# Patient Record
Sex: FEMALE | Race: WHITE | NOT HISPANIC OR LATINO | Employment: UNEMPLOYED | URBAN - METROPOLITAN AREA
[De-identification: names, ages, dates, MRNs, and addresses within clinical notes are randomized per-mention and may not be internally consistent; named-entity substitution may affect disease eponyms.]

---

## 2018-01-03 ENCOUNTER — OFFICE VISIT (OUTPATIENT)
Dept: MEDICAL GROUP | Facility: PHYSICIAN GROUP | Age: 23
End: 2018-01-03
Payer: COMMERCIAL

## 2018-01-03 VITALS
BODY MASS INDEX: 22.88 KG/M2 | HEIGHT: 64 IN | HEART RATE: 79 BPM | OXYGEN SATURATION: 95 % | DIASTOLIC BLOOD PRESSURE: 68 MMHG | WEIGHT: 134 LBS | SYSTOLIC BLOOD PRESSURE: 90 MMHG | TEMPERATURE: 98.6 F

## 2018-01-03 DIAGNOSIS — Z23 NEED FOR VACCINATION: ICD-10-CM

## 2018-01-03 DIAGNOSIS — Z00.00 ROUTINE GENERAL MEDICAL EXAMINATION AT A HEALTH CARE FACILITY: ICD-10-CM

## 2018-01-03 DIAGNOSIS — F17.210 CIGARETTE SMOKER: ICD-10-CM

## 2018-01-03 DIAGNOSIS — J01.90 ACUTE BACTERIAL RHINOSINUSITIS: ICD-10-CM

## 2018-01-03 DIAGNOSIS — G89.29 CHRONIC PELVIC PAIN IN FEMALE: ICD-10-CM

## 2018-01-03 DIAGNOSIS — B96.89 ACUTE BACTERIAL RHINOSINUSITIS: ICD-10-CM

## 2018-01-03 DIAGNOSIS — R10.2 CHRONIC PELVIC PAIN IN FEMALE: ICD-10-CM

## 2018-01-03 PROCEDURE — 99214 OFFICE O/P EST MOD 30 MIN: CPT | Mod: 25 | Performed by: PHYSICIAN ASSISTANT

## 2018-01-03 PROCEDURE — 90686 IIV4 VACC NO PRSV 0.5 ML IM: CPT | Performed by: PHYSICIAN ASSISTANT

## 2018-01-03 PROCEDURE — 90471 IMMUNIZATION ADMIN: CPT | Performed by: PHYSICIAN ASSISTANT

## 2018-01-03 RX ORDER — AMOXICILLIN AND CLAVULANATE POTASSIUM 875; 125 MG/1; MG/1
1 TABLET, FILM COATED ORAL 2 TIMES DAILY
Qty: 14 TAB | Refills: 0 | Status: SHIPPED | OUTPATIENT
Start: 2018-01-03 | End: 2018-01-10

## 2018-01-03 ASSESSMENT — PATIENT HEALTH QUESTIONNAIRE - PHQ9: CLINICAL INTERPRETATION OF PHQ2 SCORE: 0

## 2018-01-04 NOTE — ASSESSMENT & PLAN NOTE
Smokes 1/4 ppd. Is not currently interested in quitting. States that her and her significant other are currently trying to get pregnant and she will stop smoking as soon as she finds out she's pregnant.

## 2018-01-04 NOTE — PROGRESS NOTES
Subjective:   Gurpreet Chang is a 22 y.o. female here today for pelvic pain, sinus drainage. She is a new patient to me and is also establishing care today.    Previous PCP: Miladis Hurley PA-C    Patient has the following current medical problems/concerns:    Cigarette smoker  Smokes 1/4 ppd. Is not currently interested in quitting. States that her and her significant other are currently trying to get pregnant and she will stop smoking as soon as she finds out she's pregnant.    Chronic pelvic pain in female  Patient states that she's been having ongoing left-sided pelvic pain for the last 5 years. Pain associated with occasional vaginal bleeding after intercourse. Was seeing gynecology for issue and underwent pelvic examination and pap smear which patient states were normal. No records currently available to review. She states she received order to have ultrasound done, but lost the order so has not yet had test done. Is requesting that this be re-ordered today. Pain comes and goes. Sometimes worse after eating but not always. Denies any specific food triggers. Not associated with any other abdominal symptoms including nausea, vomiting, diarrhea, constipation, or bloody stools. No other genitourinary symptoms including dysuria, hematuria, urinary frequency/urgency, vaginal discharge. Wonders if allergies could be to blame and if she should see an allergist.     Patient also complains of ongoing sinus drainage, facial pressure, and earache since October. She states that there were times where symptoms were starting to get better but then would get bad again. She states that her sinus drainage is thick and green. She has not yet undergone any kind of treatment and is wondering if she needs something to help get rid of her symptoms. She has not had fever. Denies any significant cough, shortness of breath, or chest pain.     Current medicines (including changes today)  Current Outpatient Prescriptions   Medication  "Sig Dispense Refill   • amoxicillin-clavulanate (AUGMENTIN) 875-125 MG Tab Take 1 Tab by mouth 2 times a day for 7 days. 14 Tab 0     No current facility-administered medications for this visit.      She  has a past medical history of Allergy; Anemia; ASTHMA; GERD (gastroesophageal reflux disease); Headache(784.0); Migraine; and Urinary tract infection, site not specified. She also has no past medical history of Anxiety; Arrhythmia; Arthritis; Clotting disorder (CMS-HCC); COPD; Cushings syndrome (CMS-HCC); Depression; Diabetes; Heart attack; Heart murmur; HIV (human immunodeficiency virus infection); Hyperlipidemia; Hypertension; IBD (inflammatory bowel disease); OSTEOPOROSIS; Seizure (CMS-HCC); Stroke (CMS-HCC); Substance abuse; Thyroid disease; Tuberculosis; or Ulcer (CMS-HCC).      ROS  Ear ROS: Positive for earache bilaterally   Pulmonary ROS: No shortness of breath  Cardiovascular ROS: No chest pain  Gastrointestinal ROS: Positive for left lower abdominopelvic pain. No change in bowel habits, No nausea, vomiting, diarrhea, or constipation  Genitourinary ROS: No dysuria, urgency, frequency, hematuria, vaginal discharge       Objective:     Blood pressure (!) 90/68, pulse 79, temperature 37 °C (98.6 °F), height 1.626 m (5' 4\"), weight 60.8 kg (134 lb), last menstrual period 12/17/2017, SpO2 95 %, not currently breastfeeding. Body mass index is 23 kg/m².   Physical Exam:  Constitutional: Alert, no distress.  Skin: Warm, dry, good turgor, no rashes in visible areas.  Eye: Pupils are equal and round, conjunctiva clear, lids normal.  ENMT: External ear canals are without any erythema, edema, or drainage. TM are pearly gray bilaterally without injection or bulging. Nasal turbinates are inflamed and injected with thick clear-white rhinorrhea. Lips without lesions, moist mucus membranes. No pharyngeal erythema or tonsillar hypertrophy.  Neck: No masses. No submandibular or cervical lymphadenopathy. No " tenderness.  Respiratory: Unlabored respiratory effort, lungs clear to auscultation, no wheezes, no rhonchi.  Cardiovascular: Normal S1, S2, no murmur, no lower extremity edema.  Abdomen: Normoactive bowel sounds. Abdomen is soft, non-distended. Very mild tenderness in LLQ without rebound or guarding. Nontender elsewhere in the abdomen. No palpable HSM.       Assessment and Plan:   The following treatment plan was discussed    1. Chronic pelvic pain in female  Established issue, currently still uncontrolled. Recommend that patient have US done. This was re-ordered. Explained to patient that there are many causes for abdominopelvic pain but I do not suspect that allergies are to blame. Patient advised to follow up after US. If findings are normal, will need additional work-up to determine etiology of pain.  - US-GYN-PELVIS TRANSVAGINAL; Future    2. Acute bacterial rhinosinusitis  New problem. Given longevity of symptoms will treat empirically for bacterial sinusitis with Augmentin.  - amoxicillin-clavulanate (AUGMENTIN) 875-125 MG Tab; Take 1 Tab by mouth 2 times a day for 7 days.  Dispense: 14 Tab; Refill: 0    3. Cigarette smoker  Smoking cessation strongly encouraged. Not interested in any cessation medications at this time. States that when she gets she plans to do it on her own.    4. Routine general medical examination at a health care facility  Patient requested routine lab work.   - CBC WITH DIFFERENTIAL; Future  - BASIC METABOLIC PANEL; Future  - VITAMIN D,25 HYDROXY; Future  - LIPID PROFILE; Future    5. Need for vaccination  - INFLUENZA VACCINE QUAD INJ >3Y(PF)    Gynecology records requested.  Followup: Return for f/u pelvic pain after US; Short.    Michelle Jenkins P.A.-C.

## 2018-01-17 ENCOUNTER — HOSPITAL ENCOUNTER (OUTPATIENT)
Dept: RADIOLOGY | Facility: MEDICAL CENTER | Age: 23
End: 2018-01-17
Attending: PHYSICIAN ASSISTANT
Payer: COMMERCIAL

## 2018-01-17 ENCOUNTER — TELEPHONE (OUTPATIENT)
Dept: MEDICAL GROUP | Facility: PHYSICIAN GROUP | Age: 23
End: 2018-01-17

## 2018-01-17 DIAGNOSIS — R10.2 CHRONIC PELVIC PAIN IN FEMALE: ICD-10-CM

## 2018-01-17 DIAGNOSIS — G89.29 CHRONIC PELVIC PAIN IN FEMALE: ICD-10-CM

## 2018-01-17 PROCEDURE — 76830 TRANSVAGINAL US NON-OB: CPT

## 2018-01-18 NOTE — TELEPHONE ENCOUNTER
----- Message from Michelle Jenkins P.A.-C. sent at 1/17/2018  3:49 PM PST -----  Please inform patient that pelvic ultrasound was normal. If she is still having pelvic pain, recommend follow-up appointment.  Michelle Jenkins P.A.-C.

## 2018-08-13 ENCOUNTER — OFFICE VISIT (OUTPATIENT)
Dept: INTERNAL MEDICINE | Facility: MEDICAL CENTER | Age: 23
End: 2018-08-13
Payer: COMMERCIAL

## 2018-08-13 ENCOUNTER — HOSPITAL ENCOUNTER (OUTPATIENT)
Dept: LAB | Facility: MEDICAL CENTER | Age: 23
End: 2018-08-13
Attending: INTERNAL MEDICINE
Payer: COMMERCIAL

## 2018-08-13 VITALS
SYSTOLIC BLOOD PRESSURE: 120 MMHG | DIASTOLIC BLOOD PRESSURE: 80 MMHG | WEIGHT: 131.2 LBS | HEIGHT: 64 IN | OXYGEN SATURATION: 98 % | BODY MASS INDEX: 22.4 KG/M2 | HEART RATE: 70 BPM | TEMPERATURE: 98.1 F

## 2018-08-13 DIAGNOSIS — Z32.01 PREGNANCY TEST POSITIVE: ICD-10-CM

## 2018-08-13 LAB — HCG SERPL QL: POSITIVE

## 2018-08-13 PROCEDURE — 84703 CHORIONIC GONADOTROPIN ASSAY: CPT

## 2018-08-13 PROCEDURE — 36415 COLL VENOUS BLD VENIPUNCTURE: CPT

## 2018-08-13 PROCEDURE — 99213 OFFICE O/P EST LOW 20 MIN: CPT | Mod: GE | Performed by: INTERNAL MEDICINE

## 2018-08-13 NOTE — PATIENT INSTRUCTIONS
Pregnancy  If you are planning on getting pregnant, it is a good idea to make a preconception appointment with your caregiver to discuss having a healthy lifestyle before getting pregnant. This includes diet, weight, exercise, taking prenatal vitamins (especially folic acid, which helps prevent brain and spinal cord defects), avoiding alcohol, smoking and illegal drugs, medical problems (diabetes, convulsions), family history of genetic problems, working conditions, and immunizations. It is better to have knowledge of these things and do something about them before getting pregnant.  During your pregnancy, it is important to follow certain guidelines in order to have a healthy baby. It is very important to get good prenatal care and follow your caregiver's instructions. Prenatal care includes all the medical care you receive before your baby's birth. This helps to prevent problems during the pregnancy and childbirth.  HOME CARE INSTRUCTIONS   · Start your prenatal visits by the 12th week of pregnancy or earlier, if possible. At first, appointments are usually scheduled monthly. They become more frequent in the last 2 months before delivery. It is important that you keep your caregiver's appointments and follow your caregiver's instructions regarding medication use, exercise, and diet.  · During pregnancy, you are providing food for you and your baby. Eat a regular, well-balanced diet. Choose foods such as meat, fish, milk and other dairy products, vegetables, fruits, whole-grain breads and cereals. Your caregiver will inform you of the ideal weight gain depending on your current height and weight. Drink lots of liquids. Try to drink 8 glasses of water a day.  · Alcohol is associated with a number of birth defects including fetal alcohol syndrome. It is best to avoid alcohol completely. Smoking will cause low birth rate and prematurity. Use of alcohol and nicotine during your pregnancy also increases the chances that  your child will be chemically dependent later in their life and may contribute to SIDS (Sudden Infant Death Syndrome).  · Do not use illegal drugs.  · Only take prescription or over-the-counter medications that are recommended by your caregiver. Other medications can cause genetic and physical problems in the baby.  · Morning sickness can often be helped by keeping soda crackers at the bedside. Eat a few before getting up in the morning.  · A sexual relationship may be continued until near the end of pregnancy if there are no other problems such as early (premature) leaking of amniotic fluid from the membranes, vaginal bleeding, painful intercourse or belly (abdominal) pain.  · Exercise regularly. Check with your caregiver if you are unsure of the safety of some of your exercises.  · Do not use hot tubs, steam rooms or saunas. These increase the risk of fainting and hurting yourself and the baby. Swimming is OK for exercise. Get plenty of rest, including afternoon naps when possible, especially in the third trimester.  · Avoid toxic odors and chemicals.  · Do not wear high heels. They may cause you to lose your balance and fall.  · Do not lift over 5 pounds. If you do lift anything, lift with your legs and thighs, not your back.  · Avoid long trips, especially in the third trimester.  · If you have to travel out of the city or state, take a copy of your medical records with you.  SEEK IMMEDIATE MEDICAL CARE IF:   · You develop an unexplained oral temperature above 102° F (38.9° C), or as your caregiver suggests.  · You have leaking of fluid from the vagina. If leaking membranes are suspected, take your temperature and inform your caregiver of this when you call.  · There is vaginal spotting or bleeding. Notify your caregiver of the amount and how many pads are used.  · You continue to feel sick to your stomach (nauseous) and have no relief from remedies suggested, or you throw up (vomit) blood or coffee ground like  materials.  · You develop upper abdominal pain.  · You have round ligament discomfort in the lower abdominal area. This still must be evaluated by your caregiver.  · You feel contractions of the uterus.  · You do not feel the baby move, or there is less movement than before.  · You have painful urination.  · You have abnormal vaginal discharge.  · You have persistent diarrhea.  · You get a severe headache.  · You have problems with your vision.  · You develop muscle weakness.  · You feel dizzy and faint.  · You develop shortness of breath.  · You develop chest pain.  · You have back pain that travels down to your leg and feet.  · You feel irregular or a very fast heartbeat.  · You develop excessive weight gain in a short period of time (5 pounds in 3 to 5 days).  · You are involved in a domestic violence situation.  Document Released: 12/18/2006 Document Revised: 06/18/2013 Document Reviewed: 06/11/2010  [employer]® Patient Information ©2014 [employer].

## 2018-08-14 NOTE — PROGRESS NOTES
Established Patient    Gurpreet presents today with the following:    CC: Home pregnancy test positive x 3.     HPI: 23-year-old female with a history of smoking abuse currently 1/4 ppd, chronic pelvic pain/dyspareunia,  asthma/sinusitis, migraines coming in today to get a pregnancy test. Patient reports the reason she decided to come into our clinic was as she was unable to get an appointment with her regular doctor/primary care. She would like to get confirmatory pregnancy test done today. She reports she missed her period this month as her last period was in July 1st week and she is very regular. She did end up checking and/or doing a pregnancy test at home that came out to be +3 times in a row and would like to get a serum and/or blood test done today as part of the confirmatory testing. She does report early signs of pregnancy including mood changes, headache, and intermittent nausea. Denies any spotting and bleeding and/or other history of pregnancy  or miscarriages.        Patient Active Problem List    Diagnosis Date Noted   • Chronic pelvic pain in female 2018   • Cigarette smoker 2016       No current outpatient prescriptions on file.     No current facility-administered medications for this visit.          Health Maintenance        Hep C: Screening for those born between 4198-0500    Diabetes: All non-Caucasians; All Caucasians with sustained blood pressure greater than 135/80, or BMI greater than or equal to 25, or history of gestational diabetes, or family history of diabetes.    Colorectal Cancer (Options): Colonoscopy every 10 years; Fecal Occult Blood Testing every 3 years with sigmoidoscopy every 5 years; or Annual Fecal Occult Blood testing.    HIV Screening: Between ages 15-65    Alcohol Misuse:     Influenza: Yearly    Tdap/Td: Adults under 65 who have never received Tdap should get a Tdap booster, regardless of when a prior Td was given. After this, Td is required every 10  "years.    Zoster (Shingles): At age 60    Pneumococcal Vaccine: Series beginning at age 65    Men's Health  Lipid Test: Every 5 years  Prostate Specific Antigen (PSA): Current evidence does not recommend routine PSA screening for average risk men.    Women's Health  Cervical Cancer Screening: Pap only every 3 years for ages 21-29, Pap test with HPV screening every 5 years from ages 30-65  Mammogram: Every 2 years  Bone Density: At age 65        ROS: As stated above.     /80   Pulse 70   Temp 36.7 °C (98.1 °F)   Ht 1.626 m (5' 4\")   Wt 59.5 kg (131 lb 3.2 oz)   SpO2 98%   BMI 22.52 kg/m²     Physical Exam     Constitutional: Alert and oriented, No acute distress   HEENT: Normocephalic, Atraumatic, Bilateral external ears normal, Oropharynx moist mucous membranes, No oral exudates, Nose normal. No thyromegaly.   Eyes: PERRLA, EOMI, Conjunctiva normal, No discharge.   Neck: Normal range of motion, No stridor, no JVD.   Cardiovascular: Normal heart rate, Normal rhythm, No murmurs, No rubs, No gallops.   Lungs: Clear to auscultation bilaterally   Abdomen: Bowel sounds normal, Soft, , No guarding   Skin: Warm, Dry, No erythema, No rash   Neurologic: Normal motor function, No focal deficits noted, cranial nerves II through XII are normal   Psychiatric: Affect normal, Judgment normal, Mood normal.   Extremities: B/L Peripheral Pulses +, no pitting edema.      Assessment and Plan    1. Pregnancy test positive  -Signs of early pregnancy and home test was positive ×3.  -Urine test done today in the office was positive and patient was given blood test confirmatory test.  -Patient advised to establish with an OB/GYN doctor in the near future through her primary care doctor.  -She was also advised to stop smoking given various adverse/congenital birth defects especially during early pregnancy.  -Serum beta-hCG ordered today in the office.    Signed by: Melchor Youngblood M.D.    "

## 2018-08-15 ENCOUNTER — TELEPHONE (OUTPATIENT)
Dept: INTERNAL MEDICINE | Facility: MEDICAL CENTER | Age: 23
End: 2018-08-15

## 2018-08-15 NOTE — TELEPHONE ENCOUNTER
Per pt called and was just following up on her blood work results. Would like doctor to follow up on her status of the results, and notify her.

## 2018-08-15 NOTE — TELEPHONE ENCOUNTER
Informed patient that blood pregnancy test was positive. Needs to schedule appointment with OB. Given [facility] scheduling number. Recommend starting daily prenatal vitamin with DHA. Patient will  at the drugstore.  Michelle Jenkins P.A.-C.

## 2018-11-28 ENCOUNTER — OFFICE VISIT (OUTPATIENT)
Dept: URGENT CARE | Facility: PHYSICIAN GROUP | Age: 23
End: 2018-11-28
Payer: COMMERCIAL

## 2018-11-28 VITALS
OXYGEN SATURATION: 96 % | HEIGHT: 64 IN | TEMPERATURE: 98 F | BODY MASS INDEX: 25.61 KG/M2 | DIASTOLIC BLOOD PRESSURE: 66 MMHG | HEART RATE: 74 BPM | SYSTOLIC BLOOD PRESSURE: 104 MMHG | WEIGHT: 150 LBS

## 2018-11-28 DIAGNOSIS — J01.40 ACUTE PANSINUSITIS, RECURRENCE NOT SPECIFIED: ICD-10-CM

## 2018-11-28 PROCEDURE — 99214 OFFICE O/P EST MOD 30 MIN: CPT | Performed by: NURSE PRACTITIONER

## 2018-11-28 RX ORDER — AMOXICILLIN 500 MG/1
500 CAPSULE ORAL 2 TIMES DAILY
Qty: 20 CAP | Refills: 0 | Status: SHIPPED | OUTPATIENT
Start: 2018-11-28 | End: 2018-12-08

## 2018-11-28 NOTE — PROGRESS NOTES
Subjective:      Gurpreet Chang is a 23 y.o. female who presents with Sinus Problem (Headache, runny nose, ear pain, cough x 3 wks. Pt is 20 wks pregnant. )            HPI  C/o green nasal d/c, c/o frontal sinus HA, nasal congestion, runny nose, ear pain. 20 weeks pregnant. No OTC meds used.  No saline or nasal sprays, no salt water gargling. Denies fevers or sore throat.    PMH:  has a past medical history of Allergy; Anemia; ASTHMA; GERD (gastroesophageal reflux disease); Headache(784.0); Migraine; and Urinary tract infection, site not specified. She also has no past medical history of Anxiety; Arrhythmia; Arthritis; Clotting disorder (HCC); COPD; Cushings syndrome (HCC); Depression; Diabetes; Heart attack (HCC); Heart murmur; HIV (human immunodeficiency virus infection); Hyperlipidemia; Hypertension; IBD (inflammatory bowel disease); OSTEOPOROSIS; Seizure (HCC); Stroke ([facility]); Substance abuse; Thyroid disease; Tuberculosis; or Ulcer.  MEDS:   Current Outpatient Prescriptions:   •  amoxicillin (AMOXIL) 500 MG Cap, Take 1 Cap by mouth 2 times a day for 10 days., Disp: 20 Cap, Rfl: 0  ALLERGIES: No Known Allergies  SURGHX: No past surgical history on file.  SOCHX:  reports that she has been smoking Cigarettes.  She has a 3.75 pack-year smoking history. She has never used smokeless tobacco. She reports that she does not drink alcohol or use drugs.  FH: Family history was reviewed, no pertinent findings to report    Review of Systems   Constitutional: Negative for chills, fever and malaise/fatigue.   HENT: Positive for congestion, ear pain and sinus pain. Negative for ear discharge and sore throat.    Eyes: Negative for discharge and redness.   Respiratory: Negative for cough, sputum production, shortness of breath and wheezing.    Cardiovascular: Negative for chest pain, palpitations and orthopnea.   Gastrointestinal: Negative for abdominal pain, constipation, diarrhea, nausea and vomiting.   Musculoskeletal:  "Negative for myalgias and neck pain.   Skin: Negative for itching and rash.   Neurological: Negative for dizziness, weakness and headaches.   Endo/Heme/Allergies: Negative for environmental allergies.   All other systems reviewed and are negative.         Objective:     /66 (BP Location: Right arm, Patient Position: Sitting, BP Cuff Size: Adult)   Pulse 74   Temp 36.7 °C (98 °F) (Temporal)   Ht 1.626 m (5' 4\")   Wt 68 kg (150 lb)   SpO2 96%   BMI 25.75 kg/m²      Physical Exam   Constitutional: She is oriented to person, place, and time. Vital signs are normal. She appears well-developed and well-nourished. She is active and cooperative.  Non-toxic appearance. She does not have a sickly appearance. She does not appear ill. No distress.   HENT:   Head: Normocephalic.   Right Ear: External ear and ear canal normal. Tympanic membrane is injected.   Left Ear: External ear and ear canal normal. Tympanic membrane is injected.   Nose: Mucosal edema, rhinorrhea and sinus tenderness present. Right sinus exhibits maxillary sinus tenderness and frontal sinus tenderness. Left sinus exhibits maxillary sinus tenderness and frontal sinus tenderness.   Mouth/Throat: Uvula is midline, oropharynx is clear and moist and mucous membranes are normal. Mucous membranes are not dry. No uvula swelling. No posterior oropharyngeal edema or posterior oropharyngeal erythema. Tonsils are 1+ on the right. Tonsils are 1+ on the left. No tonsillar exudate.   Eyes: Pupils are equal, round, and reactive to light. Conjunctivae and EOM are normal.   Neck: Normal range of motion. Neck supple.   Cardiovascular: Normal rate and regular rhythm.    Pulmonary/Chest: Effort normal and breath sounds normal. No accessory muscle usage. No respiratory distress. She has no decreased breath sounds. She has no wheezes. She has no rhonchi. She has no rales.   Musculoskeletal: Normal range of motion.   Lymphadenopathy:     She has no cervical adenopathy. "   Neurological: She is alert and oriented to person, place, and time.   Skin: Skin is warm and dry. She is not diaphoretic.   Vitals reviewed.              Assessment/Plan:     1. Acute pansinusitis, recurrence not specified    - amoxicillin (AMOXIL) 500 MG Cap; Take 1 Cap by mouth 2 times a day for 10 days.  Dispense: 20 Cap; Refill: 0    Increase water intake  Get rest  May use Tylenol prn for any fever, body aches or throat pain  May take longer acting antihistamine for seasonal allergy symptoms prn like Claritin ( no decongestant use)  May use saline nasal spray for nasal congestion  May use Flonase for allergen nasal congestion once daily x 7 days  May take alcohol free Robitussin (guaifenesin only) at minimal dosage use only  May gargle with salt water prn for throat discomfort  May drink smoothies for nutrition if too painful to swallow solid foods  Monitor for productive cough, SOB and chest pain/tightness- need re-evaluation  F/u with OB at next visit, verify meds for sinus symptom use

## 2018-11-29 ASSESSMENT — ENCOUNTER SYMPTOMS
NAUSEA: 0
FEVER: 0
CHILLS: 0
SORE THROAT: 0
PALPITATIONS: 0
WEAKNESS: 0
SPUTUM PRODUCTION: 0
HEADACHES: 0
EYE REDNESS: 0
NECK PAIN: 0
MYALGIAS: 0
CONSTIPATION: 0
DIARRHEA: 0
EYE DISCHARGE: 0
DIZZINESS: 0
WHEEZING: 0
ABDOMINAL PAIN: 0
COUGH: 0
ORTHOPNEA: 0
SINUS PAIN: 1
SHORTNESS OF BREATH: 0
VOMITING: 0

## 2019-04-08 ENCOUNTER — ANESTHESIA EVENT (OUTPATIENT)
Dept: OBGYN | Facility: MEDICAL CENTER | Age: 24
End: 2019-04-08
Payer: COMMERCIAL

## 2019-04-08 ENCOUNTER — HOSPITAL ENCOUNTER (INPATIENT)
Facility: MEDICAL CENTER | Age: 24
LOS: 2 days | End: 2019-04-10
Attending: OBSTETRICS & GYNECOLOGY | Admitting: OBSTETRICS & GYNECOLOGY
Payer: COMMERCIAL

## 2019-04-08 ENCOUNTER — ANESTHESIA (OUTPATIENT)
Dept: OBGYN | Facility: MEDICAL CENTER | Age: 24
End: 2019-04-08
Payer: COMMERCIAL

## 2019-04-08 LAB
BASOPHILS # BLD AUTO: 0.4 % (ref 0–1.8)
BASOPHILS # BLD: 0.03 K/UL (ref 0–0.12)
EOSINOPHIL # BLD AUTO: 0.13 K/UL (ref 0–0.51)
EOSINOPHIL NFR BLD: 1.7 % (ref 0–6.9)
ERYTHROCYTE [DISTWIDTH] IN BLOOD BY AUTOMATED COUNT: 46.3 FL (ref 35.9–50)
HCT VFR BLD AUTO: 35 % (ref 37–47)
HGB BLD-MCNC: 11.8 G/DL (ref 12–16)
HOLDING TUBE BB 8507: NORMAL
IMM GRANULOCYTES # BLD AUTO: 0.06 K/UL (ref 0–0.11)
IMM GRANULOCYTES NFR BLD AUTO: 0.8 % (ref 0–0.9)
LYMPHOCYTES # BLD AUTO: 1.55 K/UL (ref 1–4.8)
LYMPHOCYTES NFR BLD: 20.2 % (ref 22–41)
MCH RBC QN AUTO: 31.5 PG (ref 27–33)
MCHC RBC AUTO-ENTMCNC: 33.7 G/DL (ref 33.6–35)
MCV RBC AUTO: 93.3 FL (ref 81.4–97.8)
MONOCYTES # BLD AUTO: 0.74 K/UL (ref 0–0.85)
MONOCYTES NFR BLD AUTO: 9.6 % (ref 0–13.4)
NEUTROPHILS # BLD AUTO: 5.18 K/UL (ref 2–7.15)
NEUTROPHILS NFR BLD: 67.3 % (ref 44–72)
NRBC # BLD AUTO: 0 K/UL
NRBC BLD-RTO: 0 /100 WBC
PLATELET # BLD AUTO: 188 K/UL (ref 164–446)
PMV BLD AUTO: 10.9 FL (ref 9–12.9)
RBC # BLD AUTO: 3.75 M/UL (ref 4.2–5.4)
WBC # BLD AUTO: 7.7 K/UL (ref 4.8–10.8)

## 2019-04-08 PROCEDURE — 700111 HCHG RX REV CODE 636 W/ 250 OVERRIDE (IP): Performed by: OBSTETRICS & GYNECOLOGY

## 2019-04-08 PROCEDURE — 700111 HCHG RX REV CODE 636 W/ 250 OVERRIDE (IP)

## 2019-04-08 PROCEDURE — 700102 HCHG RX REV CODE 250 W/ 637 OVERRIDE(OP): Performed by: OBSTETRICS & GYNECOLOGY

## 2019-04-08 PROCEDURE — 85025 COMPLETE CBC W/AUTO DIFF WBC: CPT

## 2019-04-08 PROCEDURE — 36415 COLL VENOUS BLD VENIPUNCTURE: CPT

## 2019-04-08 PROCEDURE — 700105 HCHG RX REV CODE 258: Performed by: ANESTHESIOLOGY

## 2019-04-08 PROCEDURE — 700105 HCHG RX REV CODE 258: Performed by: OBSTETRICS & GYNECOLOGY

## 2019-04-08 PROCEDURE — A9270 NON-COVERED ITEM OR SERVICE: HCPCS | Performed by: OBSTETRICS & GYNECOLOGY

## 2019-04-08 PROCEDURE — 770002 HCHG ROOM/CARE - OB PRIVATE (112)

## 2019-04-08 PROCEDURE — 10H07YZ INSERTION OF OTHER DEVICE INTO PRODUCTS OF CONCEPTION, VIA NATURAL OR ARTIFICIAL OPENING: ICD-10-PCS | Performed by: OBSTETRICS & GYNECOLOGY

## 2019-04-08 PROCEDURE — 3E033VJ INTRODUCTION OF OTHER HORMONE INTO PERIPHERAL VEIN, PERCUTANEOUS APPROACH: ICD-10-PCS | Performed by: OBSTETRICS & GYNECOLOGY

## 2019-04-08 RX ORDER — CARBOPROST TROMETHAMINE 250 UG/ML
250 INJECTION, SOLUTION INTRAMUSCULAR
Status: DISCONTINUED | OUTPATIENT
Start: 2019-04-08 | End: 2019-04-09 | Stop reason: HOSPADM

## 2019-04-08 RX ORDER — ROPIVACAINE HYDROCHLORIDE 2 MG/ML
INJECTION, SOLUTION EPIDURAL; INFILTRATION; PERINEURAL
Status: COMPLETED
Start: 2019-04-08 | End: 2019-04-08

## 2019-04-08 RX ORDER — ONDANSETRON 2 MG/ML
INJECTION INTRAMUSCULAR; INTRAVENOUS
Status: COMPLETED
Start: 2019-04-08 | End: 2019-04-08

## 2019-04-08 RX ORDER — SODIUM CHLORIDE, SODIUM LACTATE, POTASSIUM CHLORIDE, AND CALCIUM CHLORIDE .6; .31; .03; .02 G/100ML; G/100ML; G/100ML; G/100ML
1000 INJECTION, SOLUTION INTRAVENOUS
Status: COMPLETED | OUTPATIENT
Start: 2019-04-08 | End: 2019-04-08

## 2019-04-08 RX ORDER — AMPICILLIN 2 G/1
INJECTION, POWDER, FOR SOLUTION INTRAVENOUS
Status: DISCONTINUED
Start: 2019-04-08 | End: 2019-04-09 | Stop reason: HOSPADM

## 2019-04-08 RX ORDER — DEXTROSE, SODIUM CHLORIDE, SODIUM LACTATE, POTASSIUM CHLORIDE, AND CALCIUM CHLORIDE 5; .6; .31; .03; .02 G/100ML; G/100ML; G/100ML; G/100ML; G/100ML
INJECTION, SOLUTION INTRAVENOUS CONTINUOUS
Status: DISCONTINUED | OUTPATIENT
Start: 2019-04-08 | End: 2019-04-10 | Stop reason: HOSPADM

## 2019-04-08 RX ORDER — RANITIDINE 150 MG/1
150 TABLET ORAL 2 TIMES DAILY
COMMUNITY

## 2019-04-08 RX ORDER — METHYLERGONOVINE MALEATE 0.2 MG/ML
0.2 INJECTION INTRAVENOUS
Status: COMPLETED | OUTPATIENT
Start: 2019-04-08 | End: 2019-04-09

## 2019-04-08 RX ORDER — ROPIVACAINE HYDROCHLORIDE 2 MG/ML
INJECTION, SOLUTION EPIDURAL; INFILTRATION; PERINEURAL CONTINUOUS
Status: DISCONTINUED | OUTPATIENT
Start: 2019-04-08 | End: 2019-04-09 | Stop reason: HOSPADM

## 2019-04-08 RX ORDER — MISOPROSTOL 200 UG/1
800 TABLET ORAL
Status: DISCONTINUED | OUTPATIENT
Start: 2019-04-08 | End: 2019-04-09 | Stop reason: HOSPADM

## 2019-04-08 RX ORDER — SODIUM CHLORIDE, SODIUM LACTATE, POTASSIUM CHLORIDE, CALCIUM CHLORIDE 600; 310; 30; 20 MG/100ML; MG/100ML; MG/100ML; MG/100ML
INJECTION, SOLUTION INTRAVENOUS CONTINUOUS
Status: DISPENSED | OUTPATIENT
Start: 2019-04-08 | End: 2019-04-08

## 2019-04-08 RX ORDER — SODIUM CHLORIDE, SODIUM LACTATE, POTASSIUM CHLORIDE, AND CALCIUM CHLORIDE .6; .31; .03; .02 G/100ML; G/100ML; G/100ML; G/100ML
250 INJECTION, SOLUTION INTRAVENOUS PRN
Status: DISCONTINUED | OUTPATIENT
Start: 2019-04-08 | End: 2019-04-09 | Stop reason: HOSPADM

## 2019-04-08 RX ORDER — SODIUM CHLORIDE 9 MG/ML
INJECTION, SOLUTION INTRAVENOUS
Status: DISCONTINUED
Start: 2019-04-08 | End: 2019-04-09 | Stop reason: HOSPADM

## 2019-04-08 RX ORDER — ALUMINA, MAGNESIA, AND SIMETHICONE 2400; 2400; 240 MG/30ML; MG/30ML; MG/30ML
30 SUSPENSION ORAL EVERY 6 HOURS PRN
Status: DISCONTINUED | OUTPATIENT
Start: 2019-04-08 | End: 2019-04-09 | Stop reason: HOSPADM

## 2019-04-08 RX ORDER — BUPIVACAINE HCL/0.9 % NACL/PF 0.125 %
PLASTIC BAG, INJECTION (ML) EPIDURAL PRN
Status: DISCONTINUED | OUTPATIENT
Start: 2019-04-08 | End: 2019-04-09 | Stop reason: SURG

## 2019-04-08 RX ADMIN — ROPIVACAINE HYDROCHLORIDE: 2 INJECTION, SOLUTION EPIDURAL; INFILTRATION; PERINEURAL at 21:05

## 2019-04-08 RX ADMIN — SODIUM CHLORIDE, POTASSIUM CHLORIDE, SODIUM LACTATE AND CALCIUM CHLORIDE: 600; 310; 30; 20 INJECTION, SOLUTION INTRAVENOUS at 12:48

## 2019-04-08 RX ADMIN — ROPIVACAINE HYDROCHLORIDE: 2 INJECTION, SOLUTION EPIDURAL; INFILTRATION at 21:05

## 2019-04-08 RX ADMIN — Medication 2 MILLI-UNITS/MIN: at 12:48

## 2019-04-08 RX ADMIN — ALUMINUM HYDROXIDE, MAGNESIUM HYDROXIDE,SIMETHICONE 30 ML: 400; 400; 40 LIQUID ORAL at 18:02

## 2019-04-08 RX ADMIN — Medication 10 ML: at 21:01

## 2019-04-08 RX ADMIN — AMPICILLIN SODIUM 2000 MG: 2 INJECTION, POWDER, FOR SOLUTION INTRAMUSCULAR; INTRAVENOUS at 20:02

## 2019-04-08 RX ADMIN — SODIUM CHLORIDE, POTASSIUM CHLORIDE, SODIUM LACTATE AND CALCIUM CHLORIDE 1000 ML: 600; 310; 30; 20 INJECTION, SOLUTION INTRAVENOUS at 20:47

## 2019-04-08 RX ADMIN — ONDANSETRON 4 MG: 2 INJECTION INTRAMUSCULAR; INTRAVENOUS at 20:02

## 2019-04-08 ASSESSMENT — LIFESTYLE VARIABLES: EVER_SMOKED: YES

## 2019-04-08 NOTE — H&P
DATE OF ADMISSION:  2019    HISTORY OF PRESENT ILLNESS:  A 24-year-old  1, para 0 at 38 weeks and 6   days who presented to the office for evaluation of loss of fluid.  She states   that she thinks her water broke yesterday at 1:00 p.m. in the afternoon.  She   has had ongoing loss of fluid since then.  She reports good fetal movement.    She denies contractions or vaginal bleeding.    Prenatal care has been with Dr. Neumann.  Her NANCY is 2019.  She is blood   type O positive, AST negative, rubella immune, hep B surface antigen   negative, HIV negative, gonorrhea and chlamydia negative, RPR nonreactive, and   GBS negative.  Ultrasound showed a normal fetal anatomical survey.  First   trimester and AFP were normal.    PAST MEDICAL HISTORY:  Migraines.    PAST SURGICAL HISTORY:  None.    MEDICATIONS:  Prenatal vitamins.    ALLERGIES:  NKDA.    GYNECOLOGIC HISTORY:  No history of sexually transmitted disease or HSV.    OBSTETRICAL HISTORY:  Patient is a  1.    SOCIAL HISTORY:  The patient is .  She denies tobacco, alcohol or   drugs.   is named Niels.    FAMILY HISTORY:  Notable for breast cancer in her mother who currently has   stage IV disease.    PHYSICAL EXAMINATION:  VITAL SIGNS:  Afebrile.  Vital signs stable.  GENERAL:  She is well developed, well nourished female, in no acute distress.  ABDOMEN:  Gravid.  Estimated fetal weight is 7-1/2 pounds.  PELVIC:  Sterile vaginal exam shows that she is fingertip, thick and high.    SROM exam is positive for ferning.  Fetal heart tones present.    ASSESSMENT AND PLAN:  This is a 24-year-old  1, para 0 at 38 weeks and   6 days with prolonged rupture of membranes.  1.  The patient does not currently have clinical evidence of chorioamnionitis.    She is not in labor.  I have discussed the finding of ruptured membranes and   augmentation with Pitocin.  2.  Plan continuous fetal surveillance.  3.  Patient may have epidural  fentanyl p.r.n.  4.   Group B streptococcus negative.       ____________________________________     MD CLAUDIA YARBROUGH / ANNETTE    DD:  04/08/2019 12:44:32  DT:  04/08/2019 13:52:16    D#:  [id number]  Job#:  [id number]

## 2019-04-08 NOTE — PROGRESS NOTES
NANCY  38w6d. Pt presents from Dr. Neumann's office after being found SROM in the office. Pt taken to S221 for admission.     1148 TOCO and EFM applied, VSS. PT states that she first noticed clear leaking around 1300 yesterday. Pt reports +FM, denies any VB. Orders have been sent over from Dr. Hampton office for admission.      Dr. Don at bedside for IUPC placement.      RN at beside, pt requesting an epidural at this time. Bolus started and consents signed.      Report given to Larissa WHITMORE, POC discussed

## 2019-04-09 LAB
ABO GROUP BLD: NORMAL
ABO GROUP BLD: NORMAL
BLD GP AB SCN SERPL QL: NORMAL
ERYTHROCYTE [DISTWIDTH] IN BLOOD BY AUTOMATED COUNT: 45.9 FL (ref 35.9–50)
HCT VFR BLD AUTO: 29.5 % (ref 37–47)
HGB BLD-MCNC: 10 G/DL (ref 12–16)
MCH RBC QN AUTO: 31.9 PG (ref 27–33)
MCHC RBC AUTO-ENTMCNC: 33.9 G/DL (ref 33.6–35)
MCV RBC AUTO: 94.2 FL (ref 81.4–97.8)
PLATELET # BLD AUTO: 174 K/UL (ref 164–446)
PMV BLD AUTO: 11.1 FL (ref 9–12.9)
RBC # BLD AUTO: 3.13 M/UL (ref 4.2–5.4)
RH BLD: NORMAL
RH BLD: NORMAL
WBC # BLD AUTO: 13 K/UL (ref 4.8–10.8)

## 2019-04-09 PROCEDURE — 700105 HCHG RX REV CODE 258: Performed by: OBSTETRICS & GYNECOLOGY

## 2019-04-09 PROCEDURE — 59409 OBSTETRICAL CARE: CPT

## 2019-04-09 PROCEDURE — 770002 HCHG ROOM/CARE - OB PRIVATE (112)

## 2019-04-09 PROCEDURE — 304965 HCHG RECOVERY SERVICES

## 2019-04-09 PROCEDURE — 0UQGXZZ REPAIR VAGINA, EXTERNAL APPROACH: ICD-10-PCS | Performed by: OBSTETRICS & GYNECOLOGY

## 2019-04-09 PROCEDURE — 86901 BLOOD TYPING SEROLOGIC RH(D): CPT

## 2019-04-09 PROCEDURE — A9270 NON-COVERED ITEM OR SERVICE: HCPCS

## 2019-04-09 PROCEDURE — 36415 COLL VENOUS BLD VENIPUNCTURE: CPT

## 2019-04-09 PROCEDURE — 86850 RBC ANTIBODY SCREEN: CPT

## 2019-04-09 PROCEDURE — 700102 HCHG RX REV CODE 250 W/ 637 OVERRIDE(OP)

## 2019-04-09 PROCEDURE — 86900 BLOOD TYPING SEROLOGIC ABO: CPT

## 2019-04-09 PROCEDURE — 700111 HCHG RX REV CODE 636 W/ 250 OVERRIDE (IP)

## 2019-04-09 PROCEDURE — 303615 HCHG EPIDURAL/SPINAL ANESTHESIA FOR LABOR

## 2019-04-09 PROCEDURE — 700111 HCHG RX REV CODE 636 W/ 250 OVERRIDE (IP): Performed by: OBSTETRICS & GYNECOLOGY

## 2019-04-09 PROCEDURE — 30233N1 TRANSFUSION OF NONAUTOLOGOUS RED BLOOD CELLS INTO PERIPHERAL VEIN, PERCUTANEOUS APPROACH: ICD-10-PCS | Performed by: OBSTETRICS & GYNECOLOGY

## 2019-04-09 PROCEDURE — 700102 HCHG RX REV CODE 250 W/ 637 OVERRIDE(OP): Performed by: OBSTETRICS & GYNECOLOGY

## 2019-04-09 PROCEDURE — 85027 COMPLETE CBC AUTOMATED: CPT

## 2019-04-09 PROCEDURE — A9270 NON-COVERED ITEM OR SERVICE: HCPCS | Performed by: OBSTETRICS & GYNECOLOGY

## 2019-04-09 RX ORDER — MISOPROSTOL 200 UG/1
600 TABLET ORAL ONCE
Status: COMPLETED | OUTPATIENT
Start: 2019-04-09 | End: 2019-04-09

## 2019-04-09 RX ORDER — MISOPROSTOL 200 UG/1
600 TABLET ORAL
Status: DISCONTINUED | OUTPATIENT
Start: 2019-04-09 | End: 2019-04-10 | Stop reason: HOSPADM

## 2019-04-09 RX ORDER — ONDANSETRON 2 MG/ML
4 INJECTION INTRAMUSCULAR; INTRAVENOUS EVERY 6 HOURS PRN
Status: DISCONTINUED | OUTPATIENT
Start: 2019-04-09 | End: 2019-04-10 | Stop reason: HOSPADM

## 2019-04-09 RX ORDER — METOCLOPRAMIDE HYDROCHLORIDE 5 MG/ML
10 INJECTION INTRAMUSCULAR; INTRAVENOUS EVERY 6 HOURS PRN
Status: DISCONTINUED | OUTPATIENT
Start: 2019-04-09 | End: 2019-04-10 | Stop reason: HOSPADM

## 2019-04-09 RX ORDER — OXYCODONE AND ACETAMINOPHEN 10; 325 MG/1; MG/1
1 TABLET ORAL EVERY 4 HOURS PRN
Status: DISCONTINUED | OUTPATIENT
Start: 2019-04-09 | End: 2019-04-10 | Stop reason: HOSPADM

## 2019-04-09 RX ORDER — METHYLERGONOVINE MALEATE 0.2 MG/ML
INJECTION INTRAVENOUS
Status: COMPLETED
Start: 2019-04-09 | End: 2019-04-09

## 2019-04-09 RX ORDER — CARBOPROST TROMETHAMINE 250 UG/ML
250 INJECTION, SOLUTION INTRAMUSCULAR
Status: DISCONTINUED | OUTPATIENT
Start: 2019-04-09 | End: 2019-04-10 | Stop reason: HOSPADM

## 2019-04-09 RX ORDER — OXYCODONE HYDROCHLORIDE AND ACETAMINOPHEN 5; 325 MG/1; MG/1
1 TABLET ORAL EVERY 4 HOURS PRN
Status: DISCONTINUED | OUTPATIENT
Start: 2019-04-09 | End: 2019-04-10 | Stop reason: HOSPADM

## 2019-04-09 RX ORDER — ONDANSETRON 4 MG/1
4 TABLET, ORALLY DISINTEGRATING ORAL EVERY 6 HOURS PRN
Status: DISCONTINUED | OUTPATIENT
Start: 2019-04-09 | End: 2019-04-10 | Stop reason: HOSPADM

## 2019-04-09 RX ORDER — METHYLERGONOVINE MALEATE 0.2 MG/ML
0.2 INJECTION INTRAVENOUS
Status: DISCONTINUED | OUTPATIENT
Start: 2019-04-09 | End: 2019-04-10 | Stop reason: HOSPADM

## 2019-04-09 RX ORDER — MISOPROSTOL 200 UG/1
TABLET ORAL
Status: COMPLETED
Start: 2019-04-09 | End: 2019-04-09

## 2019-04-09 RX ORDER — SODIUM CHLORIDE, SODIUM LACTATE, POTASSIUM CHLORIDE, CALCIUM CHLORIDE 600; 310; 30; 20 MG/100ML; MG/100ML; MG/100ML; MG/100ML
INJECTION, SOLUTION INTRAVENOUS PRN
Status: DISCONTINUED | OUTPATIENT
Start: 2019-04-09 | End: 2019-04-10 | Stop reason: HOSPADM

## 2019-04-09 RX ORDER — ACETAMINOPHEN 325 MG/1
325 TABLET ORAL EVERY 4 HOURS PRN
Status: DISCONTINUED | OUTPATIENT
Start: 2019-04-09 | End: 2019-04-10 | Stop reason: HOSPADM

## 2019-04-09 RX ORDER — DOCUSATE SODIUM 100 MG/1
100 CAPSULE, LIQUID FILLED ORAL 2 TIMES DAILY PRN
Status: DISCONTINUED | OUTPATIENT
Start: 2019-04-09 | End: 2019-04-10 | Stop reason: HOSPADM

## 2019-04-09 RX ORDER — IBUPROFEN 600 MG/1
600 TABLET ORAL EVERY 6 HOURS PRN
Status: DISCONTINUED | OUTPATIENT
Start: 2019-04-09 | End: 2019-04-10 | Stop reason: HOSPADM

## 2019-04-09 RX ADMIN — METHYLERGONOVINE MALEATE 0.2 MG: 0.2 INJECTION INTRAVENOUS at 01:28

## 2019-04-09 RX ADMIN — IBUPROFEN 600 MG: 600 TABLET ORAL at 20:06

## 2019-04-09 RX ADMIN — Medication 2000 ML/HR: at 01:13

## 2019-04-09 RX ADMIN — AMPICILLIN SODIUM 2000 MG: 2 INJECTION, POWDER, FOR SOLUTION INTRAMUSCULAR; INTRAVENOUS at 00:28

## 2019-04-09 RX ADMIN — METHYLERGONOVINE MALEATE 0.2 MG: 0.2 INJECTION, SOLUTION INTRAMUSCULAR; INTRAVENOUS at 01:28

## 2019-04-09 RX ADMIN — Medication 125 ML/HR: at 01:56

## 2019-04-09 RX ADMIN — MISOPROSTOL 600 MCG: 200 TABLET ORAL at 01:23

## 2019-04-09 ASSESSMENT — PAIN SCALES - GENERAL: PAIN_LEVEL: 2

## 2019-04-09 NOTE — ANESTHESIA QCDR
2019 [facility] Clinical Data Registry (for Quality Improvement)     Postoperative nausea/vomiting risk protocol (Adult = 18 yrs and Pediatric 3-17 yrs)- (430 and 463)  General inhalation anesthetic (NOT TIVA) with PONV risk factors: No  Provision of anti-emetic therapy with at least 2 different classes of agents: N/A  Patient DID NOT receive anti-emetic therapy and reason is documented in Medical Record: N/A    Multimodal Pain Management- (AQI59)  Patient undergoing Elective Surgery (i.e. Outpatient, or ASC, or Prescheduled Surgery prior to Hospital Admission): No  Use of Multimodal Pain Management, two or more drugs and/or interventions, NOT including systemic opioids: N/A  Exception: Documented allergy to multiple classes of analgesics: N/A    PACU assessment of acute postoperative pain prior to Anesthesia Care End- Applies to Patients Age = 18- (ABG7)  Initial PACU pain score is which of the following: < 7/10  Patient unable to report pain score: N/A    Post-anesthetic transfer of care checklist/protocol to PACU/ICU- (426 and 427)  Upon conclusion of case, patient transferred to which of the following locations: PACU/Non-ICU  Use of transfer checklist/protocol: Yes  Exclusion: Service Performed in Patient Hospital Room (and thus did not require transfer): N/A    PACU Reintubation- (AQI31)  General anesthesia requiring endotracheal intubation (ETT) along with subsequent extubation in OR or PACU: No  Required reintubation in the PACU: N/A  Extubation was a planned trial documented in the medical record prior to removal of the original airway device: N/A    Unplanned admission to ICU related to anesthesia service up through end of PACU care- (MD51)  Unplanned admission to ICU (not initially anticipated at anesthesia start time): No

## 2019-04-09 NOTE — CARE PLAN
Problem: Altered physiologic condition related to immediate post-delivery state and potential for bleeding/hemorrhage  Goal: Patient physiologically stable as evidenced by normal lochia, palpable uterine involution and vital signs within normal limits  Outcome: PROGRESSING AS EXPECTED  Fundal massage done with light bleeding    Problem: Alteration in comfort related to episiotomy, vaginal repair and/or after birth pains  Goal: Patient verbalizes acceptable pain level  Outcome: PROGRESSING AS EXPECTED  Pain controlled

## 2019-04-09 NOTE — CONSULTS
"Met with MOB for an initial lactation visit.  MOB delivered her first baby this morning at 0110 at 39 weeks gestation.  Infant is approximately 9 hours old.  MOB stated has \"soreness\" at the right breast following latch.    Latch assistance provided at the left breast in the cross cradle position.  Please see the Lactation Assessment Flow Sheet in infant's chart for latch score and assessment.  MOB declined pain with latch.  Provided instruction on positioning of infant at the breast in the cross cradle position and demonstrated to MOB on how to build a table of pillows around her to provide her and infant with maximum support and comfort with breastfeeding.    Breastfeeding Plan:  Feed infant on demand per feeding cues and at least 8-12 times in a 24 hour period.  If infant is unable to latch onto the breast between the 12th and 24th hour of life, MOB encouraged to hand express colostrum onto a spoon and feed back all expressed breast milk to infant.    Demonstrated to MOB on how to perform hand expression and was able to expel three drops of colostrum from the right breast.  Encouraged MOB to watch the [employer] Hand Expression tutorial on her cell phone via the Internet.    Discussed what to expect with breastfeeding in the first 24-48-72 hours following delivery as well as signs of successful milk transfer.    Provided MOB with \"A New Beginning\" booklet and content reviewed.    Encouraged MOB to apply expressed colostrum onto the right nipple and areola and allow to air dry.  Informed MOB that she can follow up with an application of Lanolin cream.  Lanolin cream provided along with instructions on use.    MOB made aware of the outpatient lactation assistance available to her through the [facility].  MOB invited to attend Breastfeeding [place].    MOB verbalized understanding of all information provided to her and denied having any further questions at this time.  MOB encouraged to call for lactation " assistance as needed.

## 2019-04-09 NOTE — PROGRESS NOTES
0330 Admitted from L and D per wheelchair, Pt oriented to room, Pt educated her to call the first time go to the bathroom, Assessment done denies pain at this time, Admission care rendered.

## 2019-04-09 NOTE — ANESTHESIA POSTPROCEDURE EVALUATION
Patient: Gurpreet Chang    Procedure Summary     Date:  04/08/19 Room / Location:      Anesthesia Start:  2045 Anesthesia Stop:  04/09/19 0110    Procedure:  Labor Epidural Diagnosis:      Scheduled Providers:   Responsible Provider:  Mihaela Dykes M.D.    Anesthesia Type:  epidural ASA Status:  2          Final Anesthesia Type: epidural  Last vitals  BP   Blood Pressure: 115/85    Temp   37.7 °C (99.9 °F)    Pulse   Pulse: 75   Resp        SpO2          Anesthesia Post Evaluation    Patient location during evaluation: bedside  Patient participation: complete - patient participated  Level of consciousness: awake and alert  Pain score: 2    Airway patency: patent  Anesthetic complications: no  Cardiovascular status: adequate  Respiratory status: acceptable  Hydration status: acceptable    PONV: none

## 2019-04-09 NOTE — ANESTHESIA TIME REPORT
Anesthesia Start and Stop Event Times     Date Time Event    4/8/2019 2045 Anesthesia Start    4/9/2019 0110 Anesthesia Stop        Responsible Staff  04/08/19 to 04/09/19    Name Role Begin End    Mihaela Dykes M.D. Anesth 2045 0110        Preop Diagnosis (Free Text):  Pre-op Diagnosis             Preop Diagnosis (Codes):    Post op Diagnosis  Pain during labor      Premium Reason  A. 3PM - 7AM    Comments:

## 2019-04-09 NOTE — ANESTHESIA PROCEDURE NOTES
Epidural Block  Performed by: ISAIAH HAMMER  Authorized by: ISAIAH HAMMER     Patient Location:  OB  Start Time:  4/8/2019 9:01 PM  End Time:  4/8/2019 9:10 PM  Reason for Block: labor analgesia    patient identified, IV checked, site marked, risks and benefits discussed, surgical consent, monitors and equipment checked, pre-op evaluation and timeout performed    Patient Position:  Sitting  Prep: ChloraPrep, patient draped and sterile technique    Monitoring:  Blood pressure, continuous pulse oximetry and heart rate  Approach:  Midline  Location:  L4-L5  Injection Technique:  CLEM saline  Skin infiltration:  Lidocaine  Strength:  1%  Dose:  3ml  Needle Type:  Tuohy  Needle Gauge:  17 G  Needle Length:  3.5 in  Loss of resistance::  5  Catheter Size:  19 G  Catheter at Skin Depth:  12  Test Dose:  Lidocaine 1.5% with epinephrine 1-to-200,000  Test Dose Result:  Negative

## 2019-04-09 NOTE — ANESTHESIA PREPROCEDURE EVALUATION
Relevant Problems   No relevant active problems       Physical Exam    Airway   Mallampati: I  TM distance: >3 FB  Neck ROM: full       Cardiovascular - normal exam     Dental - normal exam         Pulmonary   Breath sounds clear to auscultation     Abdominal     Comments: gravid   Neurological - normal exam                 Anesthesia Plan    ASA 2       Plan - epidural   Neuraxial block will be labor analgesia              Pertinent diagnostic labs and testing reviewed    Informed Consent:    Anesthetic plan and risks discussed with patient.

## 2019-04-09 NOTE — PROGRESS NOTES
2200- Assumed care of pt from MYRANDA Driscoll. POC discussed, pt and family verbalized understanding.     0100- Dr. Don at bedside for delivery.    0110- Head out, viable baby boy.     0310- Pt up to bathroom to void. Madelin care provided, gown changed, pads, panties, ice pack applied.     0320- pt in wheelchair, CNA to take pt and infant upstairs via wheelchair. Accompanied by significant other and mother.

## 2019-04-09 NOTE — L&D DELIVERY NOTE
Delivery note    2019    Delivering physician: Gabriela Don MD  Attending physician: Gladis Neumann MD    Diagnoses:   1-Term intrauterine pregnancy  2-delivery of viable male Apgars 8/9  2-postpartum hemorrhage      Procedure:  1-Spontaneous vaginal delivery        Hospital course:     Patient is a 24-year-old female  1 para 0 who presented at 38-6/7 weeks with spontaneous rupture of membranes.  Based on patient's history seen her amniotic sac had ruptured almost 24 hours prior to admission at about 1 PM on .  Her cervix was fingertip thick and high at admission.  We began her on Pitocin.  Patient was also begun on ampicillin for prolonged rupture of membranes though she was afebrile.  At about 6:00 in the evening on  placed intrauterine pressure catheter.  Patient went into active labor received an epidural and progressed to complete.  She pushed for about 20 minutes.    Baby delivered an uncomplicated manner.  Baby was vigorous moving all extremities.  Cord was clamped and cut.  Baby was handed off to mom with nurse present.  Apgars were 8/9.  Placenta delivered spontaneously and intact.  Exam of uterus revealed no retained placenta.    Patient had a firm uterus but it was very cavernous.  With vigorous massage, 600 mcg of Cytotec and Methergine were able to minimize bleeding.  She had a continuous steady flow despite a firm uterus.  Exam of intrauterine cavity revealed no retained placenta.  Patient lost about 1 L of blood.  She has been crossed and typed for 2 units of packed red blood cells.    Patient had a small vaginal tear repaired with a figure-of-eight 3-0 Vicryl suture    Estimated blood loss 1 Liter

## 2019-04-10 VITALS
RESPIRATION RATE: 18 BRPM | BODY MASS INDEX: 31.58 KG/M2 | TEMPERATURE: 98.4 F | DIASTOLIC BLOOD PRESSURE: 72 MMHG | OXYGEN SATURATION: 94 % | HEART RATE: 84 BPM | SYSTOLIC BLOOD PRESSURE: 120 MMHG | HEIGHT: 64 IN | WEIGHT: 185 LBS

## 2019-04-10 RX ORDER — PSEUDOEPHEDRINE HCL 30 MG
100 TABLET ORAL 2 TIMES DAILY PRN
Qty: 60 CAP | Refills: 1 | Status: SHIPPED | OUTPATIENT
Start: 2019-04-10

## 2019-04-10 RX ORDER — IBUPROFEN 800 MG/1
800 TABLET ORAL EVERY 8 HOURS PRN
Qty: 30 TAB | Refills: 1 | Status: SHIPPED | OUTPATIENT
Start: 2019-04-10

## 2019-04-10 ASSESSMENT — EDINBURGH POSTNATAL DEPRESSION SCALE (EPDS)
I HAVE BEEN ANXIOUS OR WORRIED FOR NO GOOD REASON: NO, NOT AT ALL
THE THOUGHT OF HARMING MYSELF HAS OCCURRED TO ME: NEVER
I HAVE FELT SCARED OR PANICKY FOR NO GOOD REASON: NO, NOT AT ALL
I HAVE BEEN SO UNHAPPY THAT I HAVE BEEN CRYING: NO, NEVER
I HAVE BEEN ABLE TO LAUGH AND SEE THE FUNNY SIDE OF THINGS: AS MUCH AS I ALWAYS COULD
I HAVE BLAMED MYSELF UNNECESSARILY WHEN THINGS WENT WRONG: NOT VERY OFTEN
I HAVE BEEN SO UNHAPPY THAT I HAVE HAD DIFFICULTY SLEEPING: NOT AT ALL
I HAVE FELT SAD OR MISERABLE: NO, NOT AT ALL
THINGS HAVE BEEN GETTING ON TOP OF ME: NO, I HAVE BEEN COPING AS WELL AS EVER
I HAVE LOOKED FORWARD WITH ENJOYMENT TO THINGS: AS MUCH AS I EVER DID

## 2019-04-10 NOTE — DISCHARGE SUMMARY
Discharge Summary:      Gurpreet Chang      Admit Date:   2019  Discharge Date:  4/10/2019     Admitting diagnosis:  Pregnancy  Labor and delivery, indication for care  PROM  Prolonged rupture of membranes  Discharge Diagnosis: Status post vaginal, spontaneous.  Pregnancy Complications: none  Tubal Ligation:  no        History:  Past Medical History:   Diagnosis Date   • Allergy    • Anemia    • ASTHMA    • GERD (gastroesophageal reflux disease)    • Headache(784.0)    • Migraine    • Urinary tract infection, site not specified      OB History    Para Term  AB Living   1 1 1     1   SAB TAB Ectopic Molar Multiple Live Births           0 1      # Outcome Date GA Lbr Kamlesh/2nd Weight Sex Delivery Anes PTL Lv   1 Term 19 39w0d / 00:40 3.44 kg (7 lb 9.3 oz) M Vag-Spont EPI N FABBY           Patient has no known allergies.  Patient Active Problem List    Diagnosis Date Noted   • Chronic pelvic pain in female 2018   • Cigarette smoker 2016        Hospital Course:   24 y.o. , now para 1, was admitted with the above mentioned diagnosis, underwent Induction of Labor, vaginal, spontaneous. This was complicated by PPH of 1L.  Her Postpartum h/h was stable.  Patient postpartum course was unremarkable, with progressive advancement in diet , ambulation and toleration of oral analgesia. Patient without complaints today and desires discharge.      Vitals:    19 0600 19 1000 19 1800 04/10/19 0600   BP: 121/66 119/77 120/88 120/72   Pulse: 79 91 90 84   Resp: 17 16 18 18   Temp: 36.9 °C (98.4 °F) 36.7 °C (98 °F) 36.6 °C (97.8 °F) 36.9 °C (98.4 °F)   TempSrc: Temporal Temporal Temporal Temporal   SpO2: 94% 96% 96% 94%   Weight:       Height:           Current Facility-Administered Medications   Medication Dose   • ondansetron (ZOFRAN ODT) dispertab 4 mg  4 mg    Or   • ondansetron (ZOFRAN) syringe/vial injection 4 mg  4 mg   • metoclopramide (REGLAN) injection 10 mg  10 mg    • oxytocin (PITOCIN) infusion (for postpartum)   mL/hr   • ibuprofen (MOTRIN) tablet 600 mg  600 mg   • acetaminophen (TYLENOL) tablet 325 mg  325 mg   • oxyCODONE-acetaminophen (PERCOCET) 5-325 MG per tablet 1 Tab  1 Tab   • oxyCODONE-acetaminophen (PERCOCET-10)  MG per tablet 1 Tab  1 Tab   • LR infusion     • PRN oxytocin (PITOCIN) (20 Units/1000 mL) PRN for excessive uterine bleeding - See Admin Instr  125-999 mL/hr   • carboPROST (HEMABATE) injection 250 mcg  250 mcg   • miSOPROStol (CYTOTEC) tablet 600 mcg  600 mcg   • methylergonovine (METHERGINE) injection 0.2 mg  0.2 mg   • docusate sodium (COLACE) capsule 100 mg  100 mg   • D5LR infusion     • oxytocin (PITOCIN) infusion (for induction)  0.5-20 more-units/min       Exam:  Gen: NAD, AAO  Abdomen: Abdomen soft, non-tender. No masses,  No organomegally, FF @ U-1. No rebound/guarding.  Fundus Tender: No  Incision: none  Extremity: trace edema, no redness or tenderness in the calves or thighs, 2+DPP, Homans sign is negative, no sign of DVT       Labs:  Recent Labs      04/08/19   1212  04/09/19   0941   WBC  7.7  13.0*   RBC  3.75*  3.13*   HEMOGLOBIN  11.8*  10.0*   HEMATOCRIT  35.0*  29.5*   MCV  93.3  94.2   MCH  31.5  31.9   MCHC  33.7  33.9   RDW  46.3  45.9   PLATELETCT  188  174   MPV  10.9  11.1        Activity:   Discharge to home  Pelvic Rest x 6 weeks    Assessment:  normal postpartum course  Discharge Assessment: No heavy bleeding or foul vaginal discharge      Follow up: 6wk with Lashon BENNETT Working       Discharge Meds:   Current Outpatient Prescriptions   Medication Sig Dispense Refill   • ibuprofen (MOTRIN) 800 MG Tab Take 1 Tab by mouth every 8 hours as needed (For cramping after delivery; do not give if patient is receiving ketorolac (Toradol)). 30 Tab 1   • docusate sodium 100 MG Cap Take 100 mg by mouth 2 times a day as needed for Constipation. 60 Cap 1       Zoran Ellis M.D.

## 2019-04-10 NOTE — PROGRESS NOTES
0702- Bedside report received from MYRANDA Winkler.  Patient denied needs.  0715- Patient assessment done.  Patient stated that she is voiding without difficulty and passing flatus.  Patient denied dizziness and stated that she is walking without difficulty.  Discussed pain management plan and patient prefers to call for pain intervention as needed.  Reviewed plan of care.  Patient verbalized understanding.  0830- Patient assisted to latch infant, using football hold.  Infant spitty.  1440- IV saline lock discontinued.  1830- Patient c/o nipple pain and asked about formula feeding.  Patient using lanolin cream to nipples.  Patient does not want to put infant to breast at this time due to nipple pain.  Patient encouraged to call for assistance with latching.  Discussed benefits of exclusive breastfeeding and potential effects of use of bottle nipple before establishing breastfeeding and milk supply.  Patient willing to use breast pump.  Patient instructed on use breast pump.  Discussed benefits of hand expression after using breast pump.

## 2019-04-10 NOTE — CARE PLAN
Problem: Safety  Goal: Will remain free from injury  Pt educated to use the call light when needing assistance, pt confirmed understanding     Problem: Discharge Barriers/Planning  Goal: Patient's continuum of care needs will be met  DC home today : )

## 2019-04-10 NOTE — PROGRESS NOTES
1900- Report received from MYRANDA Dailey.    1950- Pt declines assessment at this time due to visitors, will return when they are gone    2100- Assessment completed, VSS. POC, safe sleep, feeding schedule, and pain management discussed, all questions answered.

## 2019-04-10 NOTE — CARE PLAN
Problem: Communication  Goal: The ability to communicate needs accurately and effectively will improve  Outcome: PROGRESSING AS EXPECTED  Reviewed plan of care with patient who verbalized understanding.    Problem: Altered physiologic condition related to immediate post-delivery state and potential for bleeding/hemorrhage  Goal: Patient physiologically stable as evidenced by normal lochia, palpable uterine involution and vital signs within normal limits  Outcome: PROGRESSING AS EXPECTED  Fundus firm.  Lochia light.  VSS.    Problem: Potential for postpartum infection related to presence of episiotomy/vaginal tear and/or uterine contamination  Goal: Patient will be absent from signs and symptoms of infection  Outcome: PROGRESSING AS EXPECTED  Patient is afebrile.

## 2019-04-10 NOTE — CARE PLAN
Problem: Safety  Goal: Will remain free from injury  Outcome: PROGRESSING AS EXPECTED  Pt educated on fall safety. Encouraged to keep room well lite and clutter free.     Problem: Venous Thromboembolism (VTW)/Deep Vein Thrombosis (DVT) Prevention:  Goal: Patient will participate in Venous Thrombosis (VTE)/Deep Vein Thrombosis (DVT)Prevention Measures  Outcome: PROGRESSING AS EXPECTED  Pt educated on DVT risk, encouraged to ambulate often.

## 2019-04-10 NOTE — DISCHARGE INSTRUCTIONS
POSTPARTUM DISCHARGE INSTRUCTIONS FOR MOM    YOB: 1995   Age: 24 y.o.               Admit Date: 2019     Discharge Date: 4/10/2019  Attending Doctor:  Lashon Neumann                  Allergies:  Patient has no known allergies.    Discharged to home by car. Discharged via wheelchair, hospital escort: Yes.  Special equipment needed: Not Applicable  Belongings with: Personal  Be sure to schedule a follow-up appointment with your primary care doctor or any specialists as instructed.     Discharge Plan:   Diet Plan: Discussed  Activity Level: Discussed  Smoking Cessation Offered: Patient Counseled  Confirmed Follow up Appointment: Patient to Call and Schedule Appointment  Confirmed Symptoms Management: Discussed  Medication Reconciliation Updated: Yes    REASONS TO CALL YOUR OBSTETRICIAN:  1.   Persistent fever or shaking chills (Temperature higher than 100.4)  2.   Heavy bleeding (soaking more than 1 pad per hour); Passing clots  3.   Foul odor from vagina  4.   Mastitis (Breast infection; breast pain, chills, fever, redness)  5.   Urinary pain, burning or frequency  6.   Episiotomy infection  7.   Abdominal incision infection  8.   Severe depression longer than 24 hours    HAND WASHING  · Prior to handling the baby.  · Before breastfeeding or bottle feeding baby.  · After using the bathroom or changing the baby's diaper.    WOUND CARE  Ask your physician for additional care instructions.  In general:    ·  Incision:      · Keep clean and dry.    · Do NOT lift anything heavier than your baby for up to 6 weeks.    · There should not be any opening or pus.      VAGINAL CARE  · Nothing inside vagina for 6 weeks: no sexual intercourse, tampons or douching.  · Bleeding may continue for 2-4 weeks.  Amount may vary.    · Call your physician for heavy bleeding which means soaking more than 1 pad per hour    BIRTH CONTROL  · It is possible to become pregnant at any time after delivery and while  "breastfeeding.  · Plan to discuss a method of birth control with your physician at your follow up visit. visit.    DIET AND ELIMINATION  · Eating more fiber (bran cereal, fruits, and vegetables) and drinking plenty of fluids will help to avoid constipation.  · Urinary frequency after childbirth is normal.    POSTPARTUM BLUES  During the first few days after birth, you may experience a sense of the \"blues\" which may include impatience, irritability or even crying.  These feeling come and go quickly.  However, as many as 1 in 10 women experience emotional symptoms known as postpartum depression.    Postpartum depression:  May start as early as the second or third day after delivery or take several weeks or months to develop.  Symptoms of \"blues\" are present, but are more intense:  Crying spells; loss of appetite; feelings of hopelessness or loss of control; fear of touching the baby; over concern or no concern at all about the baby; little or no concern about your own appearance/caring for yourself; and/or inability to sleep or excessive sleeping.  Contact your physician if you are experiencing any of these symptoms.    Crisis Hotline:  · [place] Crisis Hotline:  3-053-AFXSYNO  Or 1-961.519.7326  · [place] Crisis Hotline:  1-372.484.9316  Or 869-263-5005    PREVENTING SHAKEN BABY:  If you are angry or stressed, PUT THE BABY IN THE CRIB, step away, take some deep breaths, and wait until you are calm to care for the baby.  DO NOT SHAKE THE BABY.  You are not alone, call a supporter for help.    · Crisis [facility] Center 24/7 crisis line 600-411-1319 or 1-815.289.4470  · You can also text them, text \"ANSWER\" to 112952    QUIT SMOKING/TOBACCO USE:  I understand the use of any tobacco products increases my chance of suffering from future heart disease and could cause other illnesses which may shorten my life. Quitting the use of tobacco products is the single most important thing I can do to improve my health. For further " information on smoking / tobacco cessation call a Toll Free Quit Line at 1-231.294.7436 (*National [employer] [place]) or 1-688.907.2000 (American Lung Association) or you can access the web based program at www.lungusa.org.    · [place] Tobacco Users Help Line:  (296) 659-7546       Toll Free: 1-958.807.6355  · Quit Tobacco Program [facility] [place] [facility] (790)694-4967    DEPRESSION / SUICIDE RISK:  As [facility] are [facility], it is important to learn how to keep safe from harming yourself.    Recognize the warning signs:  · Abrupt changes in personality, positive or negative- including increase in energy   · Giving away possessions  · Change in eating patterns- significant weight changes-  positive or negative  · Change in sleeping patterns- unable to sleep or sleeping all the time   · Unwillingness or inability to communicate  · Depression  · Unusual sadness, discouragement and loneliness  · Talk of wanting to die  · Neglect of personal appearance   · Rebelliousness- reckless behavior  · Withdrawal from people/activities they love  · Confusion- inability to concentrate     If you or a loved one observes any of these behaviors or has concerns about self-harm, here's what you can do:  · Talk about it- your feelings and reasons for harming yourself  · Remove any means that you might use to hurt yourself (examples: pills, rope, extension cords, firearm)  · Get professional help from the community (Mental Health, Substance Abuse, psychological counseling)  · Do not be alone:Call your Safe Contact- someone whom you trust who will be there for you.  · Call your local CRISIS HOTLINE 556-9553 or 189-620-1909  · Call your local Children's Mobile Crisis Response Team [place] (971) 826-3660 or www.[employer]  · Call the toll free National Suicide Prevention Hotlines   · National Suicide Prevention Lifeline 850-196-MFGI (3036)  · National Hope Line Network 800-SUICIDE  (438-8541)    DISCHARGE SURVEY:  Thank you for choosing [facility].  We hope we provided you with very good care.  You may be receiving a survey in the mail.  Please fill it out.  Your opinion is valuable to us.    ADDITIONAL EDUCATIONAL MATERIALS GIVEN TO PATIENT:        My signature on this form indicates that:  1.  I have reviewed and understand the above information  2.  My questions regarding this information have been answered to my satisfaction.  3.  I have formulated a plan with my discharge nurse to obtain my prescribed medication for home.

## 2019-04-10 NOTE — LACTATION NOTE
"This note was copied from a baby's chart.  Mago lactation intern, visited MOB. \"Met with MOB. Infant was circumcised this morning. Discussed sleepy behavior post procedure. MOB is reporting that infant was fussy all night and cluster feeding. MOB has fairly flat nipples and is feeling like infant gets frustrated at the breast. MOB decided over night to give infant formula at 5-20 mls. MOB is pumping every 3 hours to support milk supply. Taught slow paced bottle feeding to MOB and FOB. MOB wanted to have assistance with latch. Gave baby about 5 mls to settle because he was hungry and then put baby to breast to latch and infant was able to organize. Infant was able to latch in CC on the L breast. Encouraged MOB to call for further latching assistance. Also encouraged MOB to join our breastfeeding groups for support after hospital stay.\" Followed by FAY Osei RN, IBCLC  "

## 2019-10-16 NOTE — ASSESSMENT & PLAN NOTE
Patient states that she's been having ongoing left-sided pelvic pain for the last 5 years. Pain associated with occasional vaginal bleeding after intercourse. Was seeing gynecology for issue and underwent pelvic examination and pap smear which patient states were normal. No records currently available to review. She states she received order to have ultrasound done, but lost the order so has not yet had test done. Is requesting that this be re-ordered today. Pain comes and goes. Sometimes worse after eating but not always. Denies any specific food triggers. Not associated with any other abdominal symptoms including nausea, vomiting, diarrhea, constipation, or bloody stools. No other genitourinary symptoms including dysuria, hematuria, urinary frequency/urgency, vaginal discharge. Wonders if allergies could be to blame and if she should see an allergist.    4

## 2019-12-27 ENCOUNTER — OFFICE VISIT (OUTPATIENT)
Dept: URGENT CARE | Facility: CLINIC | Age: 24
End: 2019-12-27
Payer: MEDICAID

## 2019-12-27 VITALS
OXYGEN SATURATION: 96 % | RESPIRATION RATE: 16 BRPM | SYSTOLIC BLOOD PRESSURE: 102 MMHG | HEART RATE: 71 BPM | WEIGHT: 136 LBS | BODY MASS INDEX: 23.22 KG/M2 | DIASTOLIC BLOOD PRESSURE: 82 MMHG | TEMPERATURE: 97.6 F | HEIGHT: 64 IN

## 2019-12-27 DIAGNOSIS — R09.82 PND (POST-NASAL DRIP): ICD-10-CM

## 2019-12-27 DIAGNOSIS — R09.81 SINUS CONGESTION: ICD-10-CM

## 2019-12-27 PROCEDURE — 99214 OFFICE O/P EST MOD 30 MIN: CPT | Performed by: PHYSICIAN ASSISTANT

## 2019-12-27 RX ORDER — AMOXICILLIN AND CLAVULANATE POTASSIUM 875; 125 MG/1; MG/1
1 TABLET, FILM COATED ORAL 2 TIMES DAILY
Qty: 20 TAB | Refills: 0 | Status: SHIPPED | OUTPATIENT
Start: 2019-12-27

## 2019-12-27 RX ORDER — CETIRIZINE HYDROCHLORIDE 10 MG/1
10 TABLET ORAL DAILY
Qty: 30 TAB | Refills: 0 | Status: SHIPPED | OUTPATIENT
Start: 2019-12-27

## 2019-12-27 RX ORDER — FLUTICASONE PROPIONATE 50 MCG
2 SPRAY, SUSPENSION (ML) NASAL DAILY
Qty: 16 G | Refills: 0 | Status: SHIPPED | OUTPATIENT
Start: 2019-12-27

## 2019-12-27 ASSESSMENT — ENCOUNTER SYMPTOMS
COUGH: 1
SORE THROAT: 1
SHORTNESS OF BREATH: 0
CHILLS: 0
FEVER: 0
VOMITING: 0
NAUSEA: 0
DIARRHEA: 0
WHEEZING: 0
ABDOMINAL PAIN: 0
SPUTUM PRODUCTION: 1
SINUS PAIN: 1

## 2019-12-27 NOTE — PROGRESS NOTES
"Subjective:   Gurpreet Chang  is a 24 y.o. female who presents for Sore Throat (white pockets started yesterday on her tonsils, but had sore throat since x thanksgiving )        Pharyngitis    This is a new problem. The current episode started 1 to 4 weeks ago. Associated symptoms include congestion and coughing. Pertinent negatives include no abdominal pain, diarrhea, ear pain, shortness of breath or vomiting.     Patient comes in clinic describing sinus congestion and pressure times last 3 to 4 weeks.  Notes sore throat worse at night in the morning.  Concern for the possibility of strep.  Notes mild persistent coughing is been present throughout.  Denies fever any longer but did have at onset.  Denies ear pain.  Denies nausea vomiting abdominal pain diarrhea or rash.  Notes tenderness over sinuses.  Denies history of seasonal allergies.  Notes past medical history of sinusitis and is concerned for the possibility.    Review of Systems   Constitutional: Negative for chills and fever ( Resolved).   HENT: Positive for congestion, sinus pain and sore throat. Negative for ear pain.    Respiratory: Positive for cough and sputum production. Negative for shortness of breath and wheezing.    Gastrointestinal: Negative for abdominal pain, diarrhea, nausea and vomiting.   Skin: Negative for rash.   Endo/Heme/Allergies: Negative for environmental allergies.     No Known Allergies   I have worn a mask for the entire encounter with this patient.    Objective:   /82   Pulse 71   Temp 36.4 °C (97.6 °F) (Temporal)   Resp 16   Ht 1.626 m (5' 4\")   Wt 61.7 kg (136 lb)   SpO2 96%   BMI 23.34 kg/m²   Physical Exam  Vitals signs and nursing note reviewed.   Constitutional:       General: She is not in acute distress.     Appearance: She is well-developed. She is not diaphoretic.   HENT:      Head: Normocephalic and atraumatic.      Right Ear: Tympanic membrane, ear canal and external ear normal. Tympanic membrane is " not erythematous.      Left Ear: Tympanic membrane, ear canal and external ear normal. Tympanic membrane is not erythematous.      Nose:      Right Sinus: Maxillary sinus tenderness and frontal sinus tenderness present.      Left Sinus: Maxillary sinus tenderness and frontal sinus tenderness present.      Mouth/Throat:      Pharynx: Uvula midline. Posterior oropharyngeal erythema ( mild PND) present. No oropharyngeal exudate.      Tonsils: No tonsillar abscesses.   Eyes:      General: Lids are normal. No scleral icterus.        Right eye: No discharge.         Left eye: No discharge.      Conjunctiva/sclera: Conjunctivae normal.   Neck:      Musculoskeletal: Neck supple.   Pulmonary:      Effort: Pulmonary effort is normal. No respiratory distress.      Breath sounds: No decreased breath sounds, wheezing, rhonchi or rales.   Musculoskeletal: Normal range of motion.   Lymphadenopathy:      Cervical: Cervical adenopathy ( mild bilat) present.   Skin:     General: Skin is warm and dry.      Coloration: Skin is not pale.      Findings: No erythema.   Neurological:      Mental Status: She is alert and oriented to person, place, and time. She is not disoriented.   Psychiatric:         Speech: Speech normal.         Behavior: Behavior normal.           Assessment/Plan:   1. Pharyngitis, unspecified etiology    2. Sinus congestion  - amoxicillin-clavulanate (AUGMENTIN) 875-125 MG Tab; Take 1 Tab by mouth 2 times a day.  Dispense: 20 Tab; Refill: 0  - fluticasone (FLONASE) 50 MCG/ACT nasal spray; Spray 2 Sprays in nose every day.  Dispense: 16 g; Refill: 0  - cetirizine (ZYRTEC ALLERGY) 10 MG Tab; Take 1 Tab by mouth every day.  Dispense: 30 Tab; Refill: 0  Supportive care is reviewed with patient/caregiver - recommend to push PO fluids and electrolytes, Nsaids/tylenol, netti pot/saline irrig, humidifier in home, flonase, ponaris, antihistamine,  take full course of Rx, take with probiotics, observe for resolution  Return to  clinic with lack of resolution or progression of symptoms.    Differential diagnosis, natural history, supportive care, and indications for immediate follow-up discussed.

## 2020-02-13 ENCOUNTER — HOSPITAL ENCOUNTER (EMERGENCY)
Facility: MEDICAL CENTER | Age: 25
End: 2020-02-13
Attending: EMERGENCY MEDICINE
Payer: MEDICAID

## 2020-02-13 ENCOUNTER — APPOINTMENT (OUTPATIENT)
Dept: RADIOLOGY | Facility: MEDICAL CENTER | Age: 25
End: 2020-02-13
Attending: EMERGENCY MEDICINE
Payer: MEDICAID

## 2020-02-13 VITALS
RESPIRATION RATE: 17 BRPM | HEIGHT: 64 IN | TEMPERATURE: 98.1 F | DIASTOLIC BLOOD PRESSURE: 57 MMHG | WEIGHT: 128.97 LBS | SYSTOLIC BLOOD PRESSURE: 114 MMHG | OXYGEN SATURATION: 98 % | HEART RATE: 52 BPM | BODY MASS INDEX: 22.02 KG/M2

## 2020-02-13 DIAGNOSIS — N20.1 URETEROLITHIASIS: ICD-10-CM

## 2020-02-13 DIAGNOSIS — N39.0 ACUTE UTI: ICD-10-CM

## 2020-02-13 LAB
ANION GAP SERPL CALC-SCNC: 11 MMOL/L (ref 0–11.9)
APPEARANCE UR: ABNORMAL
BACTERIA #/AREA URNS HPF: ABNORMAL /HPF
BASOPHILS # BLD AUTO: 0.5 % (ref 0–1.8)
BASOPHILS # BLD: 0.05 K/UL (ref 0–0.12)
BILIRUB UR QL STRIP.AUTO: NEGATIVE
BUN SERPL-MCNC: 10 MG/DL (ref 8–22)
CALCIUM SERPL-MCNC: 9.7 MG/DL (ref 8.5–10.5)
CHLORIDE SERPL-SCNC: 104 MMOL/L (ref 96–112)
CO2 SERPL-SCNC: 21 MMOL/L (ref 20–33)
COLOR UR: YELLOW
CREAT SERPL-MCNC: 1.07 MG/DL (ref 0.5–1.4)
EOSINOPHIL # BLD AUTO: 0.07 K/UL (ref 0–0.51)
EOSINOPHIL NFR BLD: 0.6 % (ref 0–6.9)
EPI CELLS #/AREA URNS HPF: ABNORMAL /HPF
ERYTHROCYTE [DISTWIDTH] IN BLOOD BY AUTOMATED COUNT: 43.1 FL (ref 35.9–50)
GLUCOSE SERPL-MCNC: 90 MG/DL (ref 65–99)
GLUCOSE UR STRIP.AUTO-MCNC: NEGATIVE MG/DL
HCG UR QL: NEGATIVE
HCT VFR BLD AUTO: 42.8 % (ref 37–47)
HGB BLD-MCNC: 14.6 G/DL (ref 12–16)
HYALINE CASTS #/AREA URNS LPF: ABNORMAL /LPF
IMM GRANULOCYTES # BLD AUTO: 0.04 K/UL (ref 0–0.11)
IMM GRANULOCYTES NFR BLD AUTO: 0.4 % (ref 0–0.9)
KETONES UR STRIP.AUTO-MCNC: 80 MG/DL
LEUKOCYTE ESTERASE UR QL STRIP.AUTO: ABNORMAL
LYMPHOCYTES # BLD AUTO: 1.02 K/UL (ref 1–4.8)
LYMPHOCYTES NFR BLD: 9.3 % (ref 22–41)
MCH RBC QN AUTO: 31.5 PG (ref 27–33)
MCHC RBC AUTO-ENTMCNC: 34.1 G/DL (ref 33.6–35)
MCV RBC AUTO: 92.4 FL (ref 81.4–97.8)
MICRO URNS: ABNORMAL
MONOCYTES # BLD AUTO: 0.99 K/UL (ref 0–0.85)
MONOCYTES NFR BLD AUTO: 9 % (ref 0–13.4)
NEUTROPHILS # BLD AUTO: 8.77 K/UL (ref 2–7.15)
NEUTROPHILS NFR BLD: 80.2 % (ref 44–72)
NITRITE UR QL STRIP.AUTO: NEGATIVE
NRBC # BLD AUTO: 0 K/UL
NRBC BLD-RTO: 0 /100 WBC
PH UR STRIP.AUTO: 5 [PH] (ref 5–8)
PLATELET # BLD AUTO: 264 K/UL (ref 164–446)
PMV BLD AUTO: 11.1 FL (ref 9–12.9)
POTASSIUM SERPL-SCNC: 4.1 MMOL/L (ref 3.6–5.5)
PROT UR QL STRIP: 30 MG/DL
RBC # BLD AUTO: 4.63 M/UL (ref 4.2–5.4)
RBC # URNS HPF: ABNORMAL /HPF
RBC UR QL AUTO: ABNORMAL
SODIUM SERPL-SCNC: 136 MMOL/L (ref 135–145)
SP GR UR STRIP.AUTO: 1.02
UROBILINOGEN UR STRIP.AUTO-MCNC: 0.2 MG/DL
WBC # BLD AUTO: 10.9 K/UL (ref 4.8–10.8)
WBC #/AREA URNS HPF: ABNORMAL /HPF

## 2020-02-13 PROCEDURE — 85025 COMPLETE CBC W/AUTO DIFF WBC: CPT

## 2020-02-13 PROCEDURE — 96375 TX/PRO/DX INJ NEW DRUG ADDON: CPT

## 2020-02-13 PROCEDURE — 74176 CT ABD & PELVIS W/O CONTRAST: CPT

## 2020-02-13 PROCEDURE — 80048 BASIC METABOLIC PNL TOTAL CA: CPT

## 2020-02-13 PROCEDURE — 81001 URINALYSIS AUTO W/SCOPE: CPT

## 2020-02-13 PROCEDURE — 99285 EMERGENCY DEPT VISIT HI MDM: CPT

## 2020-02-13 PROCEDURE — 96365 THER/PROPH/DIAG IV INF INIT: CPT

## 2020-02-13 PROCEDURE — 87086 URINE CULTURE/COLONY COUNT: CPT

## 2020-02-13 PROCEDURE — 700111 HCHG RX REV CODE 636 W/ 250 OVERRIDE (IP): Performed by: EMERGENCY MEDICINE

## 2020-02-13 PROCEDURE — 700105 HCHG RX REV CODE 258: Performed by: EMERGENCY MEDICINE

## 2020-02-13 PROCEDURE — 81025 URINE PREGNANCY TEST: CPT

## 2020-02-13 RX ORDER — KETOROLAC TROMETHAMINE 30 MG/ML
15 INJECTION, SOLUTION INTRAMUSCULAR; INTRAVENOUS ONCE
Status: COMPLETED | OUTPATIENT
Start: 2020-02-13 | End: 2020-02-13

## 2020-02-13 RX ORDER — IBUPROFEN 600 MG/1
600 TABLET ORAL EVERY 6 HOURS PRN
Qty: 30 TAB | Refills: 0 | Status: SHIPPED | OUTPATIENT
Start: 2020-02-13

## 2020-02-13 RX ORDER — CEFDINIR 300 MG/1
300 CAPSULE ORAL 2 TIMES DAILY
Qty: 20 CAP | Refills: 0 | Status: SHIPPED | OUTPATIENT
Start: 2020-02-13 | End: 2020-02-23

## 2020-02-13 RX ORDER — ONDANSETRON 2 MG/ML
4 INJECTION INTRAMUSCULAR; INTRAVENOUS ONCE
Status: COMPLETED | OUTPATIENT
Start: 2020-02-13 | End: 2020-02-13

## 2020-02-13 RX ORDER — SODIUM CHLORIDE 9 MG/ML
1000 INJECTION, SOLUTION INTRAVENOUS ONCE
Status: COMPLETED | OUTPATIENT
Start: 2020-02-13 | End: 2020-02-13

## 2020-02-13 RX ORDER — ONDANSETRON 4 MG/1
4 TABLET, ORALLY DISINTEGRATING ORAL EVERY 6 HOURS PRN
Qty: 10 TAB | Refills: 0 | Status: SHIPPED | OUTPATIENT
Start: 2020-02-13

## 2020-02-13 RX ADMIN — SODIUM CHLORIDE 1000 ML: 9 INJECTION, SOLUTION INTRAVENOUS at 19:00

## 2020-02-13 RX ADMIN — CEFTRIAXONE SODIUM 2 G: 2 INJECTION, POWDER, FOR SOLUTION INTRAMUSCULAR; INTRAVENOUS at 20:03

## 2020-02-13 RX ADMIN — KETOROLAC TROMETHAMINE 15 MG: 30 INJECTION, SOLUTION INTRAMUSCULAR at 19:22

## 2020-02-13 RX ADMIN — ONDANSETRON 4 MG: 2 INJECTION INTRAMUSCULAR; INTRAVENOUS at 18:59

## 2020-02-13 ASSESSMENT — ENCOUNTER SYMPTOMS
ABDOMINAL PAIN: 0
FLANK PAIN: 1
HEADACHES: 0
SHORTNESS OF BREATH: 0
BACK PAIN: 0
COUGH: 0
NECK PAIN: 0
NAUSEA: 1
SEIZURES: 0
FEVER: 1
VOMITING: 1
BLURRED VISION: 0
EYE REDNESS: 0
SORE THROAT: 0
CHILLS: 0
FOCAL WEAKNESS: 0

## 2020-02-13 ASSESSMENT — PAIN DESCRIPTION - DESCRIPTORS: DESCRIPTORS: ACHING

## 2020-02-13 ASSESSMENT — LIFESTYLE VARIABLES: DO YOU DRINK ALCOHOL: NO

## 2020-02-14 NOTE — DISCHARGE INSTRUCTIONS
You were seen in the Emergency Department for kidney stone and urine infection.    Labs were completed without significant acute abnormalities. CT scan showed small kidney stone.    Please use 1,000mg of tylenol or 600mg of ibuprofen every 6 hours as needed for pain.  Take antibiotics as directed.  Drink plenty of fluids.    Please follow up with your primary care physician and urology as soon as possible.    Return to the Emergency Department with persistent fevers>100.4, uncontrolled pain or vomiting, or other concerns.

## 2020-02-14 NOTE — ED TRIAGE NOTES
Pain with urination, left sided flank pain, nausea and vomiting, sharp pain to the left flank area, temp of 101 noted at home.  No relief from otc medications.

## 2020-02-14 NOTE — ED PROVIDER NOTES
ED Provider Note    CHIEF COMPLAINT  Chief Complaint   Patient presents with   • Flank Pain     left        HPI  Gurpreet Chang is a 24 y.o. otherwise healthy female who presents with left flank pain.  Patient states her symptoms started on Monday, 4 days prior to arrival.  She states it started with some mild intermittent dysuria and lower abdominal cramping.  She states that pain became much more severe and radiating around the left side to the left flank.  She states that the pain was bad for Monday and Tuesday and then improved on Wednesday however worsened today.  She has had ongoing urgency however dysuria has resolved.  She reports a fever at home of 101 today.  She has not taken any ibuprofen or Tylenol since last night.  She has had some nausea and dry heaving as well as possibly constipation.  Patient denies chance of pregnancy stating that she has an IUD.  She does not have a personal history of kidney stones although it does run in her family.  No prior abdominal surgeries.    REVIEW OF SYSTEMS  See HPI for further details.   Review of Systems   Constitutional: Positive for fever. Negative for chills.   HENT: Negative for sore throat.    Eyes: Negative for blurred vision and redness.   Respiratory: Negative for cough and shortness of breath.    Cardiovascular: Negative for chest pain and leg swelling.   Gastrointestinal: Positive for nausea and vomiting. Negative for abdominal pain.   Genitourinary: Positive for dysuria, flank pain and urgency.   Musculoskeletal: Negative for back pain and neck pain.   Skin: Negative for rash.   Neurological: Negative for focal weakness, seizures and headaches.   Psychiatric/Behavioral: Negative for suicidal ideas.         PAST MEDICAL HISTORY   has a past medical history of Allergy, Anemia, ASTHMA, GERD (gastroesophageal reflux disease), Headache(784.0), Migraine, and Urinary tract infection, site not specified.    SOCIAL HISTORY  Social History     Tobacco Use   •  "Smoking status: Current Every Day Smoker     Packs/day: 0.25     Years: 15.00     Pack years: 3.75     Types: Cigarettes   • Smokeless tobacco: Never Used   Substance and Sexual Activity   • Alcohol use: No   • Drug use: No   • Sexual activity: Yes     Partners: Male       SURGICAL HISTORY  patient denies any surgical history    CURRENT MEDICATIONS  Home Medications     Reviewed by Anisa Granados R.N. (Registered Nurse) on 02/13/20 at 1720  Med List Status: <None>   Medication Last Dose Status   amoxicillin-clavulanate (AUGMENTIN) 875-125 MG Tab  Active   cetirizine (ZYRTEC ALLERGY) 10 MG Tab  Active   docusate sodium 100 MG Cap  Active   fluticasone (FLONASE) 50 MCG/ACT nasal spray  Active   ibuprofen (MOTRIN) 800 MG Tab  Active   Prenatal MV-Min-Fe Fum-FA-DHA (PRENATAL 1 PO)  Active   raNITidine (ZANTAC) 150 MG Tab  Active                ALLERGIES  No Known Allergies    PHYSICAL EXAM   VITAL SIGNS: /57   Pulse (!) 52   Temp 36.7 °C (98.1 °F) (Temporal)   Resp 17   Ht 1.626 m (5' 4\")   Wt 58.5 kg (128 lb 15.5 oz)   SpO2 98%   BMI 22.14 kg/m²      Physical Exam   Constitutional: She is oriented to person, place, and time and well-developed, well-nourished, and in no distress. No distress.   Nontoxic-appearing young female   HENT:   Head: Normocephalic and atraumatic.   Eyes: Pupils are equal, round, and reactive to light. Conjunctivae are normal.   Neck: Normal range of motion. Neck supple.   Cardiovascular: Normal rate, regular rhythm and normal heart sounds.   Pulmonary/Chest: Effort normal and breath sounds normal. No respiratory distress.   Abdominal: Soft. She exhibits no distension. There is abdominal tenderness.   Left flank and left lower quadrant tenderness to palpation   Musculoskeletal: Normal range of motion.         General: No tenderness or edema.   Neurological: She is alert and oriented to person, place, and time.   Moving all extremities spontaneously   Skin: Skin is warm and dry. "   Psychiatric: Affect normal.         DIAGNOSTIC STUDIES        LABS  Personally reviewed by me  Labs Reviewed   URINALYSIS,CULTURE IF INDICATED - Abnormal; Notable for the following components:       Result Value    Character Cloudy (*)     Ketones 80 (*)     Protein 30 (*)     Leukocyte Esterase Small (*)     Occult Blood Large (*)     All other components within normal limits   CBC WITH DIFFERENTIAL - Abnormal; Notable for the following components:    WBC 10.9 (*)     Neutrophils-Polys 80.20 (*)     Lymphocytes 9.30 (*)     Neutrophils (Absolute) 8.77 (*)     Monos (Absolute) 0.99 (*)     All other components within normal limits   URINE MICROSCOPIC (W/UA) - Abnormal; Notable for the following components:    WBC 10-20 (*)     RBC  (*)     Bacteria Few (*)     Epithelial Cells Moderate (*)     Hyaline Cast 3-5 (*)     All other components within normal limits   HCG QUALITATIVE UR   BASIC METABOLIC PANEL   URINE CULTURE(NEW)   ESTIMATED GFR           RADIOLOGY  Personally reviewed by me  CT-RENAL COLIC EVALUATION(A/P W/O)   Final Result      1.  3 mm distal LEFT ureteral stone with moderate obstructive changes.   2.  No secondary signs of acute appendicitis, however the appendix is not visualized.   3.  Increased colonic gas, nonspecific.            ED COURSE  Vitals:    02/13/20 1925 02/13/20 1931 02/13/20 2001 02/13/20 2031   BP: 132/75 127/72 116/67 114/57   Pulse: 64  (!) 59 (!) 52   Resp:    17   Temp:    36.7 °C (98.1 °F)   TempSrc:    Temporal   SpO2: 98% 100% 99% 98%   Weight:       Height:             Medications administered:  Medications   NS infusion 1,000 mL (0 mL Intravenous Stopped 2/13/20 2009)   ketorolac (TORADOL) injection 15 mg (15 mg Intravenous Given 2/13/20 1922)   ondansetron (ZOFRAN) syringe/vial injection 4 mg (4 mg Intravenous Given 2/13/20 1859)   cefTRIAXone (ROCEPHIN) 2 g in  mL IVPB (0 g Intravenous Stopped 2/13/20 2040)     Patient was given IV fluids for vomiting and  possible dehydration.  IV hydration was used because oral hydration was not adequate alone.  Following fluid administration patient's symptoms and hydration were improved.      MEDICAL DECISION MAKING  Otherwise healthy female presents with 4-day history of left-sided flank pain and urinary urgency.  She is afebrile with normal vital signs on arrival and nontoxic-appearing.  Abdominal exam is not concerning for underlying surgical process such as appendicitis, obstruction, perforation.  Her urinalysis does show a significant amount of blood in addition to possible minimal infection.  The patient is not pregnant.  Labs are overall reassuring other than minimal leukocytosis.  Imaging does show evidence of a 3 mm kidney stone with obstructive changes which is likely the etiology of the patient's pain.  She does look like she may have a minimal associated UTI but does not appear to have any signs of sepsis or severely infected stone.    I discussed the case with Dr. Cooper, urologist on-call.  He feels comfortable with discharge home with antibiotics and close outpatient follow-up.    Upon reassessment, patient is resting comfortably with normal vital signs.  No new complaints at this time.  She endorses significant improvement of her pain after Toradol.  Discussed results with patient and/or family as well as importance of antibiotics and urology follow up.  Patient understands plan of care and strict return precautions for new or changing symptoms.       IMPRESSION  (N20.1) Ureterolithiasis  (N39.0) Acute UTI    Disposition: Discharge home, stable condition  Results, diagnoses, and treatment options were discussed with the patient and/or family. Patient verbalized understanding of plan of care.    Patient referred to primary care provider for monitoring and treatment of blood pressure.      Discharge Medication List as of 2/13/2020  8:49 PM      START taking these medications    Details   !! ibuprofen (MOTRIN) 600 MG  Tab Take 1 Tab by mouth every 6 hours as needed (pain)., Disp-30 Tab, R-0, Print Rx Paper      ondansetron (ZOFRAN ODT) 4 MG TABLET DISPERSIBLE Take 1 Tab by mouth every 6 hours as needed., Disp-10 Tab, R-0, Print Rx Paper      cefdinir (OMNICEF) 300 MG Cap Take 1 Cap by mouth 2 times a day for 10 days., Disp-20 Cap, R-0, Print Rx Paper       !! - Potential duplicate medications found. Please discuss with provider.              Electronically signed by: Nicki Garrison M.D., 2/13/2020 6:10 PM

## 2020-02-14 NOTE — ED NOTES
"Pt discharged home via ambulatory to lobby with steady gait, AOx4, family accompanying. IV discontinued and gauze placed, pt in possession of belongings. Pt provided discharge education and information pertaining to medications and follow up appointments. Pt received copy of discharge instructions and verbalized understanding.     /57   Pulse (!) 52   Temp 36.7 °C (98.1 °F) (Temporal)   Resp 17   Ht 1.626 m (5' 4\")   Wt 58.5 kg (128 lb 15.5 oz)   SpO2 98%   BMI 22.14 kg/m²   "

## 2020-02-15 LAB
BACTERIA UR CULT: NORMAL
SIGNIFICANT IND 70042: NORMAL
SITE SITE: NORMAL
SOURCE SOURCE: NORMAL

## 2023-05-24 ENCOUNTER — HOSPITAL ENCOUNTER (OUTPATIENT)
Facility: MEDICAL CENTER | Age: 28
End: 2023-05-24
Attending: UROLOGY
Payer: MEDICAID

## 2023-05-24 LAB
FORWARD REASON: SPWHY: NORMAL
FORWARDED TO LAB: SPWHR: NORMAL
SPECIMEN SENT: SPWT1: NORMAL